# Patient Record
Sex: FEMALE | Race: WHITE | NOT HISPANIC OR LATINO | Employment: FULL TIME | URBAN - METROPOLITAN AREA
[De-identification: names, ages, dates, MRNs, and addresses within clinical notes are randomized per-mention and may not be internally consistent; named-entity substitution may affect disease eponyms.]

---

## 2023-04-07 ENCOUNTER — OFFICE VISIT (OUTPATIENT)
Dept: PRIMARY CARE | Facility: CLINIC | Age: 58
End: 2023-04-07
Payer: COMMERCIAL

## 2023-04-07 VITALS
HEART RATE: 65 BPM | WEIGHT: 251 LBS | SYSTOLIC BLOOD PRESSURE: 115 MMHG | OXYGEN SATURATION: 97 % | TEMPERATURE: 98.6 F | HEIGHT: 71 IN | BODY MASS INDEX: 35.14 KG/M2 | DIASTOLIC BLOOD PRESSURE: 75 MMHG

## 2023-04-07 DIAGNOSIS — T78.40XA ALLERGY, INITIAL ENCOUNTER: ICD-10-CM

## 2023-04-07 DIAGNOSIS — H10.13 ALLERGIC CONJUNCTIVITIS OF BOTH EYES: Primary | ICD-10-CM

## 2023-04-07 PROCEDURE — 99213 OFFICE O/P EST LOW 20 MIN: CPT | Performed by: INTERNAL MEDICINE

## 2023-04-07 RX ORDER — LEVOCETIRIZINE DIHYDROCHLORIDE 5 MG/1
5 TABLET, FILM COATED ORAL EVERY EVENING
Qty: 14 TABLET | Refills: 1 | Status: SHIPPED | OUTPATIENT
Start: 2023-04-07 | End: 2023-04-21

## 2023-04-07 RX ORDER — PROPRANOLOL HYDROCHLORIDE 10 MG/1
10 TABLET ORAL DAILY PRN
COMMUNITY

## 2023-04-07 RX ORDER — IPRATROPIUM BROMIDE 42 UG/1
2 SPRAY, METERED NASAL DAILY
COMMUNITY
Start: 2022-08-10

## 2023-04-07 RX ORDER — HYDROQUINONE 40 MG/G
CREAM TOPICAL 2 TIMES DAILY
COMMUNITY
Start: 2022-10-28

## 2023-04-07 RX ORDER — OLOPATADINE HYDROCHLORIDE 1 MG/ML
1 SOLUTION/ DROPS OPHTHALMIC 2 TIMES DAILY PRN
Qty: 5 ML | Refills: 2 | Status: SHIPPED | OUTPATIENT
Start: 2023-04-07 | End: 2023-04-22

## 2023-04-07 ASSESSMENT — PAIN SCALES - GENERAL: PAINLEVEL: 0-NO PAIN

## 2023-04-07 ASSESSMENT — LIFESTYLE VARIABLES
AUDIT-C TOTAL SCORE: 0
HOW MANY STANDARD DRINKS CONTAINING ALCOHOL DO YOU HAVE ON A TYPICAL DAY: PATIENT DOES NOT DRINK
HOW OFTEN DO YOU HAVE A DRINK CONTAINING ALCOHOL: NEVER
HOW OFTEN DO YOU HAVE SIX OR MORE DRINKS ON ONE OCCASION: NEVER
SKIP TO QUESTIONS 9-10: 1

## 2023-04-07 ASSESSMENT — COLUMBIA-SUICIDE SEVERITY RATING SCALE - C-SSRS: 1. IN THE PAST MONTH, HAVE YOU WISHED YOU WERE DEAD OR WISHED YOU COULD GO TO SLEEP AND NOT WAKE UP?: NO

## 2023-04-07 ASSESSMENT — PATIENT HEALTH QUESTIONNAIRE - PHQ9
1. LITTLE INTEREST OR PLEASURE IN DOING THINGS: NOT AT ALL
SUM OF ALL RESPONSES TO PHQ9 QUESTIONS 1 AND 2: 0
2. FEELING DOWN, DEPRESSED OR HOPELESS: NOT AT ALL

## 2023-04-07 NOTE — PROGRESS NOTES
"Subjective   Patient ID: Tomeka Perea is a 57 y.o. female who presents for Blepharitis.    HPI patient present s to clinic complaining of swelling of left eye for 2 months and redness of  both eye for 3 months.  Her symptoms are associated with pruritus.  She denies any headache, visual loss, nausea vomiting, facial pain and shortness of breath.  She has past history of  factor x def,colonic polyp,gerd,vitamin d def,melasma,lumbar radiculopathy    Review of Systems   Constitutional: Negative.    HENT: Negative.     Eyes:  Positive for redness and itching.   Respiratory: Negative.     Cardiovascular: Negative.    Gastrointestinal: Negative.    Endocrine: Negative.    Genitourinary: Negative.    Musculoskeletal: Negative.    Skin: Negative.    Allergic/Immunologic: Negative.    Neurological: Negative.    Hematological: Negative.    Psychiatric/Behavioral: Negative.         Objective   /75   Pulse 65   Temp 37 °C (98.6 °F)   Ht 1.803 m (5' 11\")   Wt 114 kg (251 lb)   SpO2 97%   BMI 35.01 kg/m²     Physical Exam  Constitutional:       Appearance: Normal appearance. She is normal weight.   HENT:      Right Ear: Tympanic membrane normal.      Left Ear: Tympanic membrane and ear canal normal.      Nose: Nose normal.   Eyes:      Comments: Mild erythema of the eyes without any blister formation   Neck:      Vascular: No carotid bruit.   Cardiovascular:      Rate and Rhythm: Normal rate.   Pulmonary:      Effort: No respiratory distress.      Breath sounds: No stridor. No wheezing.   Abdominal:      Palpations: Abdomen is soft.      Tenderness: There is no guarding or rebound.   Skin:     Coloration: Skin is not jaundiced.   Neurological:      Mental Status: She is alert.   Psychiatric:         Mood and Affect: Mood normal.          Assessment/Plan    patient is given trial with Xyzal and Patanol eyedrops regarding symptoms of allergic conjunctivitis and allergies.  She will continue other home medications " and will notify the clinic for any worsening of her symptoms.

## 2023-04-07 NOTE — PROGRESS NOTES
"Subjective   Patient ID: Tomeka Perea is a 57 y.o. female who presents for Blepharitis.    HPI     Review of Systems    Objective   /75   Pulse 65   Temp 37 °C (98.6 °F)   Ht 1.803 m (5' 11\")   Wt 114 kg (251 lb)   SpO2 97%   BMI 35.01 kg/m²     Physical Exam    Assessment/Plan          "

## 2023-04-11 ASSESSMENT — ENCOUNTER SYMPTOMS
PSYCHIATRIC NEGATIVE: 1
EYE ITCHING: 1
EYE REDNESS: 1
GASTROINTESTINAL NEGATIVE: 1
NEUROLOGICAL NEGATIVE: 1
RESPIRATORY NEGATIVE: 1
ENDOCRINE NEGATIVE: 1
ALLERGIC/IMMUNOLOGIC NEGATIVE: 1
CARDIOVASCULAR NEGATIVE: 1
CONSTITUTIONAL NEGATIVE: 1
MUSCULOSKELETAL NEGATIVE: 1
HEMATOLOGIC/LYMPHATIC NEGATIVE: 1

## 2023-09-12 PROBLEM — Z86.010 HX OF COLONIC POLYPS: Status: ACTIVE | Noted: 2023-09-12

## 2023-09-12 PROBLEM — R76.8 HELICOBACTER PYLORI AB+: Status: ACTIVE | Noted: 2023-09-12

## 2023-09-12 PROBLEM — Z04.9 CONDITION NOT FOUND: Status: ACTIVE | Noted: 2023-09-12

## 2023-09-12 PROBLEM — D12.6 ADENOMATOUS POLYP OF COLON: Status: ACTIVE | Noted: 2023-09-12

## 2023-09-12 PROBLEM — D69.9 BLEEDING DISORDER (CMS-HCC): Status: ACTIVE | Noted: 2023-09-12

## 2023-09-12 PROBLEM — L82.0 INFLAMED SEBORRHEIC KERATOSIS: Status: ACTIVE | Noted: 2022-07-01

## 2023-09-12 PROBLEM — L57.9 SKIN CHANGES DUE TO CHRONIC EXPOSURE TO NONIONIZING RADIATION, UNSPECIFIED: Status: ACTIVE | Noted: 2022-07-01

## 2023-09-12 PROBLEM — K62.5 RECTUM BLEEDING: Status: ACTIVE | Noted: 2023-09-12

## 2023-09-12 PROBLEM — L81.4 OTHER MELANIN HYPERPIGMENTATION: Status: ACTIVE | Noted: 2022-07-01

## 2023-09-12 PROBLEM — D18.01 HEMANGIOMA OF SKIN AND SUBCUTANEOUS TISSUE: Status: ACTIVE | Noted: 2022-07-01

## 2023-09-12 PROBLEM — R68.2 DRY MOUTH: Status: ACTIVE | Noted: 2023-09-12

## 2023-09-12 PROBLEM — R10.13 EPIGASTRIC PAIN: Status: ACTIVE | Noted: 2023-09-12

## 2023-09-12 PROBLEM — D22.4 MELANOCYTIC NEVI OF SCALP AND NECK: Status: ACTIVE | Noted: 2022-07-01

## 2023-09-12 PROBLEM — R53.83 FATIGUE: Status: ACTIVE | Noted: 2023-09-12

## 2023-09-12 PROBLEM — M54.17 LUMBOSACRAL RADICULOPATHY AT L5: Status: ACTIVE | Noted: 2023-09-12

## 2023-09-12 PROBLEM — Z86.0100 HX OF COLONIC POLYPS: Status: ACTIVE | Noted: 2023-09-12

## 2023-09-12 PROBLEM — D22.70 MELANOCYTIC NEVI OF UNSPECIFIED LOWER LIMB, INCLUDING HIP: Status: ACTIVE | Noted: 2022-07-01

## 2023-09-12 PROBLEM — L73.8 OTHER SPECIFIED FOLLICULAR DISORDERS: Status: ACTIVE | Noted: 2022-07-01

## 2023-09-12 PROBLEM — Z87.39 H/O DEGENERATIVE DISC DISEASE: Status: ACTIVE | Noted: 2023-09-12

## 2023-09-12 PROBLEM — D22.60 MELANOCYTIC NEVI OF UNSPECIFIED UPPER LIMB, INCLUDING SHOULDER: Status: ACTIVE | Noted: 2022-07-01

## 2023-09-12 PROBLEM — D68.2: Status: ACTIVE | Noted: 2023-09-12

## 2023-09-12 PROBLEM — R57.8 HEMORRHAGIC SHOCK (MULTI): Status: ACTIVE | Noted: 2023-09-12

## 2023-09-12 PROBLEM — M54.50 LOW BACK PAIN: Status: ACTIVE | Noted: 2023-09-12

## 2023-09-12 PROBLEM — D48.5 NEOPLASM OF UNCERTAIN BEHAVIOR OF SKIN: Status: ACTIVE | Noted: 2022-07-01

## 2023-09-12 PROBLEM — K62.5 RECTAL BLEEDING: Status: ACTIVE | Noted: 2023-09-12

## 2023-09-12 PROBLEM — L57.3 POIKILODERMA OF CIVATTE: Status: ACTIVE | Noted: 2022-07-01

## 2023-09-12 PROBLEM — E78.00 HYPERCHOLESTEROLEMIA: Status: ACTIVE | Noted: 2023-09-12

## 2023-09-12 PROBLEM — E66.9 OBESITY (BMI 30-39.9): Status: ACTIVE | Noted: 2023-09-12

## 2023-09-12 PROBLEM — D22.5 MELANOCYTIC NEVI OF TRUNK: Status: ACTIVE | Noted: 2022-07-01

## 2023-09-12 PROBLEM — D22.39 MELANOCYTIC NEVI OF OTHER PARTS OF FACE: Status: ACTIVE | Noted: 2022-07-01

## 2023-09-12 PROBLEM — R21 RASH AND OTHER NONSPECIFIC SKIN ERUPTION: Status: ACTIVE | Noted: 2022-07-01

## 2023-09-12 PROBLEM — R10.9 INTESTINAL CRAMPS: Status: ACTIVE | Noted: 2023-09-12

## 2023-09-12 PROBLEM — G43.109 MIGRAINE WITH AURA AND WITHOUT STATUS MIGRAINOSUS, NOT INTRACTABLE: Status: ACTIVE | Noted: 2023-09-12

## 2023-09-12 PROBLEM — K21.9 ESOPHAGEAL REFLUX: Status: ACTIVE | Noted: 2023-09-12

## 2023-09-12 PROBLEM — E04.9 GOITER: Status: ACTIVE | Noted: 2023-09-12

## 2023-09-12 PROBLEM — R14.0 ABDOMINAL BLOATING: Status: ACTIVE | Noted: 2023-09-12

## 2023-09-12 PROBLEM — L82.1 OTHER SEBORRHEIC KERATOSIS: Status: ACTIVE | Noted: 2022-07-01

## 2023-09-12 RX ORDER — CLINDAMYCIN PHOSPHATE 10 UG/ML
1 LOTION TOPICAL
COMMUNITY
Start: 2022-06-28

## 2023-09-12 RX ORDER — OMEPRAZOLE 40 MG/1
40 CAPSULE, DELAYED RELEASE ORAL DAILY
COMMUNITY

## 2023-09-12 RX ORDER — TRETINOIN 0.25 MG/G
1 CREAM TOPICAL
COMMUNITY
Start: 2022-06-28

## 2023-09-12 RX ORDER — PROPRANOLOL HYDROCHLORIDE 10 MG/1
5 TABLET ORAL 3 TIMES DAILY
COMMUNITY

## 2023-09-12 RX ORDER — CLOBETASOL PROPIONATE 0.5 MG/G
1 CREAM TOPICAL
COMMUNITY
Start: 2018-03-09

## 2023-10-23 ENCOUNTER — OFFICE VISIT (OUTPATIENT)
Dept: HEMATOLOGY/ONCOLOGY | Facility: HOSPITAL | Age: 58
End: 2023-10-23
Payer: COMMERCIAL

## 2023-10-23 VITALS
HEART RATE: 59 BPM | OXYGEN SATURATION: 100 % | BODY MASS INDEX: 34.51 KG/M2 | RESPIRATION RATE: 20 BRPM | HEIGHT: 71 IN | DIASTOLIC BLOOD PRESSURE: 72 MMHG | WEIGHT: 246.47 LBS | SYSTOLIC BLOOD PRESSURE: 123 MMHG | TEMPERATURE: 98.1 F

## 2023-10-23 DIAGNOSIS — D68.2: Primary | ICD-10-CM

## 2023-10-23 DIAGNOSIS — E61.1 IRON DEFICIENCY: ICD-10-CM

## 2023-10-23 PROCEDURE — 1036F TOBACCO NON-USER: CPT | Performed by: STUDENT IN AN ORGANIZED HEALTH CARE EDUCATION/TRAINING PROGRAM

## 2023-10-23 PROCEDURE — 99214 OFFICE O/P EST MOD 30 MIN: CPT | Performed by: STUDENT IN AN ORGANIZED HEALTH CARE EDUCATION/TRAINING PROGRAM

## 2023-10-23 ASSESSMENT — COLUMBIA-SUICIDE SEVERITY RATING SCALE - C-SSRS
6. HAVE YOU EVER DONE ANYTHING, STARTED TO DO ANYTHING, OR PREPARED TO DO ANYTHING TO END YOUR LIFE?: NO
2. HAVE YOU ACTUALLY HAD ANY THOUGHTS OF KILLING YOURSELF?: NO

## 2023-10-23 ASSESSMENT — PAIN SCALES - GENERAL: PAINLEVEL: 0-NO PAIN

## 2023-10-23 ASSESSMENT — ENCOUNTER SYMPTOMS
OCCASIONAL FEELINGS OF UNSTEADINESS: 0
DEPRESSION: 0
LOSS OF SENSATION IN FEET: 0

## 2023-10-23 NOTE — PROGRESS NOTES
Patient ID: Tomeka Perea is a 58 y.o. female.  Referring Physician: No referring provider defined for this encounter.  Primary Care Provider: Johnna Zuñiga MD  Visit Type: Initial Visit  Diagnosis: mild factor X deficiency       Subjective    HPI  58 y.o. female with a PMH of multiple nasal surgeries due to cleft lip, COVID (09/ 21), GERD and HLD who is coming in for evaluation due to concern for bleeding disorder.       As regards her bleeding history,   - She denies excessive bleeding from umbilical cord at birth, falls and injuries, dental procedures. No easy bruising. Not currently having menses but when she did she reports that they were not heavy. Had correction of cleft palate w/o significant bleeding during or after procedure.   - However after that she had multiple nasal surgeries (at least 5-6) over the past 25 years. During/post which significant bleeding was noted. However she has never had a full coag work up for bleeding, but reports that her plastic surgeon gave her what  she thinks is Vitamin K IV preceding and after her procedures to control bleeding. She reports bleeding 'instantly ceasing' once the IV starts, which is not consistent.   - Most recently, in 09/2021 patient underwent colonoscopy with polypectomy.  S/p polypectomy patient had immediate and severe bleeding from the site with  drop in Hgb and was given 2U of blood.  Needing admission and bleeding was eventually controlled only  after embolization of a branch of the NICKOLAS by IR. During that time her PT/ INR was elevated at 18.2/ 1.6 without being on any prior anticoagulation. Fibrinogen was 289. She thinks she was given IV vit K, but is unsure as she incidentally had covid at this  time and was unable to see her IV infusions. I cannot find any evidence of any blood products on file given at this time. Nor Vit K.      She denies any history of spontaneous nose bleeds, dark or bloody stool and dark or bloody urine.    Denies any  family history of bleeding disorder.        work up showed: inconsistently elevated PT/PTT, in fact was normal when we first saw her, but prolonged on follow up. CBC WNL.   TT 14.6, Fib 393-->410, DD 1210-->1477, JOSE M 1 38.4, plasminogen 38.4, XI 80  II 95-->96, V 95-->102, ,   VIII 168, vW Ag 144, vW GP1bM 110, vW CB 95, vW multimers normal pattern and distribution,   , X 42-->48  XIII adequate   Plt aggs: DECREASED AGGREGATION TO ADP (5 uM) AND EPINEPHRINE (100 uM). A LABORATORY STUDY OF PLATELET AGGREGATION AND STIMULATED GRANULE RELEASE WAS PERFORMED. THE PLATELET AGGREGATION  STUDY IS ABNORMAL. THERE IS DECREASED PLATELET AGGREGATION WITH ADP (5 uM) AND EPINEPHRINE (100 uM). THE AGGREGATION WITH THE OTHER AGONISTS AND DOSES WERE WITHIN THE    NORMAL RANGE. THE STIMULATED ATP RELEASE WAS WITHIN THE NORMAL RANGE FOR ALL OF THE  AGONISTS. THE RISTOCETIN INDUCED PLATELET AGGREGATION SHOWS AN INCREASED DOSE RESPONSE.  THE PATTERN IS NOT INDICATIVE OF A SPECIFIC DIAGNOSIS, BUT GLANZMANN THROMBASTHENIA, EDGARDO SOULIER DISEASE AND PLATELET DENSE GRANULE STORAGE POOL DISORDER ARE UNLIKELY. THIS INTERPRETATION IS RENDERED IN THE ABSENCE OF A MEDICATION AND CLINICAL HISTORY.      5/25/2022: pt reports no further bleeding events. We met to discuss her labs and plan for future testing and procedures. 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      8/29/2022:   Today she reports she is doing well with no complaints. She reports she will have a colonoscopy on 9/26/22 at [facility] and that the provider doing the procedure has requested guidance for the procedure. I given her mild factor deficiency,  I had planned to give her FFP for the procedure, however due to logistical issues this could not be accomplished and the gastroenterologist elected to proceed with a colo/EGD w/o biopsy,  diverticulosis and hemorrhoids were seen but no polyps, planned  to repeat in 3yrs.      10/17/2022: at follow up today pt reports the procedure was uneventful. She continues to be concerned about possibly acquiring amyloidosis as she  believes this could possibly cause her bleeding with factor X related issues despite genetic evidence.     10/23/23: notes issues with dry eye. No issues with bleeding. No planned procedures.     Review of Systems - Oncology  10 point review of systems negative except as stated in HPI    Objective     Past Surgical History:   Procedure Laterality Date    CT ABDOMEN PELVIS ANGIOGRAM W AND/OR WO IV CONTRAST  9/21/2021    CT ABDOMEN PELVIS ANGIOGRAM W AND/OR WO IV CONTRAST 9/21/2021 [facility] CLINICAL LEGACY    IR ANGIOGRAM AORTA ABDOMEN  9/21/2021    IR ANGIOGRAM AORTA ABDOMEN 9/21/2021 [facility] CLINICAL LEGACY    IR ANGIOGRAM AORTA ABDOMEN  9/21/2021    IR ANGIOGRAM AORTA ABDOMEN 9/21/2021 [facility] CLINICAL LEGACY    IR ANGIOGRAM INFERIOR EPIGASTRIC  9/21/2021    IR ANGIOGRAM INFERIOR EPIGASTRIC 9/21/2021 [facility] CLINICAL LEGACY    IR ANGIOGRAM INFERIOR EPIGASTRIC PELVIC  9/21/2021    IR ANGIOGRAM INFERIOR EPIGASTRIC PELVIC 9/21/2021 [facility] CLINICAL LEGACY    IR ANGIOGRAM INFERIOR EPIGASTRIC PELVIC  9/21/2021    IR ANGIOGRAM INFERIOR EPIGASTRIC PELVIC 9/21/2021 [facility] CLINICAL LEGACY    IR ANGIOGRAM INFERIOR EPIGASTRIC PELVIC  9/21/2021    IR ANGIOGRAM INFERIOR EPIGASTRIC PELVIC 9/21/2021 [facility] CLINICAL LEGACY    IR INTERVENTION VENOUS ARTERIAL PTA  9/21/2021    IR INTERVENTION VENOUS ARTERIAL PTA 9/21/2021 [facility] CLINICAL LEGACY    OTHER SURGICAL HISTORY  07/22/2014    Repair Of Cleft Lip    OTHER SURGICAL HISTORY  07/22/2014    Subcutaneous Injection Of Filling Material     Social history: Social Hx:  has 10 siblings, No EtOH, smoking or illicit drug use.   Family Hx:  Mother DMII and Breast cancer x 2 currently in remission.       reports that she has never smoked. She has never been exposed to tobacco  "smoke. She has never used smokeless tobacco.  She  reports that she does not currently use alcohol.  She  reports no history of drug use.  Family History   Problem Relation Name Age of Onset    Breast cancer Mother      Hypertension Father      Kidney disease Father       Oncology History    No history exists.       Physical Exam  Vitals reviewed.   Constitutional:       General: She is not in acute distress.     Appearance: She is normal weight.   HENT:      Head: Normocephalic and atraumatic.   Eyes:      Comments: No pallor or icterus   Neck:      Comments: No palpable cervical or axillary adenopathy   Cardiovascular:      Rate and Rhythm: Normal rate and regular rhythm.      Heart sounds: No murmur heard.  Abdominal:      Palpations: Abdomen is soft.      Tenderness: There is no abdominal tenderness.      Comments: No palpable hepatosplenomegaly    Skin:     General: Skin is warm and dry.      Comments: No bruising    Neurological:      General: No focal deficit present.      Mental Status: She is alert and oriented to person, place, and time.   Psychiatric:         Mood and Affect: Mood normal.       BSA: 2.37 meters squared  /72   Pulse 59   Temp 36.7 °C (98.1 °F) (Core)   Resp 20   Ht (S) 1.809 m (5' 11.22\")   Wt 112 kg (246 lb 7.6 oz)   SpO2 100%   BMI 34.16 kg/m²     Labs:  Lab Results   Component Value Date    WBC 5.7 12/15/2022    NEUTROABS 2.42 03/10/2022    LYMPHSABS 1.73 03/10/2022    MONOSABS 0.30 03/10/2022    EOSABS 0.12 03/10/2022    BASOSABS 0.06 03/10/2022    RBC 5.26 (H) 12/15/2022    MCV 88 12/15/2022    MCHC 33.2 12/15/2022    HGB 15.3 12/15/2022    HCT 46.1 (H) 12/15/2022     12/15/2022     No results found for: \"RETICCTPCT\"   Lab Results   Component Value Date    CREATININE 0.77 12/15/2022    BUN 13 12/15/2022     12/15/2022    K 4.3 12/15/2022     12/15/2022    CO2 27 12/15/2022      Lab Results   Component Value Date    ALT 10 12/15/2022    AST 14 12/15/2022 " "   ALKPHOS 64 12/15/2022    BILITOT 1.0 12/15/2022      Lab Results   Component Value Date    TSH 1.37 12/15/2022     Lab Results   Component Value Date    TSH 1.37 12/15/2022     Lab Results   Component Value Date    IRON 57 02/02/2022    TIBC 396 02/02/2022      No results found for: \"XTAPBEAD47\"   No results found for: \"FOLATE\"  No results found for: \"VANGIE\", \"RF\", \"SEDRATE\"   Lab Results   Component Value Date    CRP 1.32 (A) 02/03/2021      No results found for: \"JESENIA\"  No results found for: \"LDH\"  No results found for: \"HAPTOGLOBIN\"  Lab Results   Component Value Date    SPEP NORMAL 08/29/2022     No results found for: \"IGG\", \"IGM\", \"IGA\"     No components found for: \"PT\"  aPTT   Date/Time Value Ref Range Status   02/10/2022 11:50 AM 42 (H) 26 - 39 sec Final     Comment:     Note new reference range as of 11/30/2021 at 10:00am.   02/02/2022 11:45 AM 41 (H) 26 - 39 sec Final     Comment:     Note new reference range as of 11/30/2021 at 10:00am.   12/02/2021 11:38 AM 40 (H) 26 - 39 sec Final     Comment:     Note new reference range as of 11/30/2021 at 10:00am.       Assessment/Plan    58 y.o. female with a PMH significant for cleft lip, GERD, HLD, who is followed for mild factor X deficiency with surgical bleeding.      Her extensive work up has shown elevated PT/PTT, decreased factor X levels to indicate a mild deficiency and her genetic F10 evaluation has confirmed a diagnosis of at least carrier state of factor X deficiency. Her bleeding history however points to  clinically relevant bleeding resulting from factor X deficiency and resolving with administration of FFP. Work up for amyloid negative.     Plan:   - She has not received factor yet, which is a consideration if she has high risk surgery. This will need to be organized for non-emergencies as this factor is not stored is significant quantities in hospitals.  - For lower risk procedures she should be fine with FFP.  - no planned procedures for now  - 1yr " followup   - I have advised the pt on her bleeding risk and the need for caution/planning with surgical procedures. Also asked her to get a medical bracelet with her factor deficiency noted if she is unable to speak for herself.      Maycol Silverio MD

## 2024-07-10 ENCOUNTER — OFFICE VISIT (OUTPATIENT)
Dept: PRIMARY CARE | Facility: CLINIC | Age: 59
End: 2024-07-10
Payer: COMMERCIAL

## 2024-07-10 VITALS
BODY MASS INDEX: 27.22 KG/M2 | SYSTOLIC BLOOD PRESSURE: 146 MMHG | HEIGHT: 72 IN | WEIGHT: 201 LBS | DIASTOLIC BLOOD PRESSURE: 80 MMHG

## 2024-07-10 DIAGNOSIS — R53.83 OTHER FATIGUE: ICD-10-CM

## 2024-07-10 DIAGNOSIS — R73.03 PRE-DIABETES: ICD-10-CM

## 2024-07-10 DIAGNOSIS — J40 BRONCHITIS: ICD-10-CM

## 2024-07-10 DIAGNOSIS — E78.00 HYPERCHOLESTEROLEMIA: ICD-10-CM

## 2024-07-10 PROCEDURE — 99213 OFFICE O/P EST LOW 20 MIN: CPT | Performed by: INTERNAL MEDICINE

## 2024-07-10 RX ORDER — LEVOFLOXACIN 500 MG/1
500 TABLET, FILM COATED ORAL DAILY
Qty: 10 TABLET | Refills: 0 | Status: SHIPPED | OUTPATIENT
Start: 2024-07-10 | End: 2024-07-20

## 2024-07-10 RX ORDER — LORATADINE 10 MG/1
10 TABLET ORAL DAILY
Qty: 30 TABLET | Refills: 2 | Status: SHIPPED | OUTPATIENT
Start: 2024-07-10 | End: 2024-10-08

## 2024-07-10 RX ORDER — FLUTICASONE FUROATE 27.5 UG/1
1 SPRAY, METERED NASAL
Qty: 10 G | Refills: 5 | Status: SHIPPED | OUTPATIENT
Start: 2024-07-10 | End: 2025-07-10

## 2024-07-10 RX ORDER — GUAIFENESIN 100 MG/5ML
200 SOLUTION ORAL 3 TIMES DAILY PRN
Qty: 120 ML | Refills: 0 | Status: SHIPPED | OUTPATIENT
Start: 2024-07-10 | End: 2024-07-20

## 2024-07-10 NOTE — LETTER
July 10, 2024     Patient: Tomeka Perea   YOB: 1965   Date of Visit: 7/10/2024       To Whom It May Concern:    Tomeka Perea was seen in my clinic on 7/10/2024 at 12:00 pm. Please excuse Tomeka for her absence from work 07/11/2024 through 07/14/2024. Tomeka may return on 07/15/2024.    If you have any questions or concerns, please don't hesitate to call.         Sincerely,         Traci Serrano MD

## 2024-07-11 ASSESSMENT — ENCOUNTER SYMPTOMS: COUGH: 1

## 2024-07-11 NOTE — PROGRESS NOTES
"Subjective   Patient ID: Tomeka Perea is a 58 y.o. female who presents for Establish Care, Earache, and Cough.    The patient today came here for multiple medical issues.  Cough, yellow sputum, sinus congestion, bronchitis going on for last several days.  She took doxycycline, but not with good results.  She came for follow-up.    I have personally reviewed the patient's Past Medical History, Medications, Allergies, Social History, and Family History in the EMR.    CURRENT MEDICATIONS: Nothing regularly, but she is just finishing doxycycline.    Earache   Associated symptoms include coughing.   Cough  Associated symptoms include ear pain.     Review of Systems   HENT:  Positive for ear pain.    Respiratory:  Positive for cough.    All other systems reviewed and are negative.  She has a Factor X deficiency and she was born with a cleft lip palate.    Objective   /80   Ht 1.816 m (5' 11.5\")   Wt 91.2 kg (201 lb)   BMI 27.64 kg/m²     Physical Exam  Vitals reviewed.   HENT:      Right Ear: Tympanic membrane is retracted.      Left Ear: Tympanic membrane is retracted.      Nose: Congestion present.      Comments: Postnasal drip.     Mouth/Throat:      Comments: Throat congested.    Cardiovascular:      Heart sounds: Normal heart sounds, S1 normal and S2 normal. No murmur heard.     No friction rub.   Pulmonary:      Effort: Pulmonary effort is normal.      Breath sounds: Normal breath sounds and air entry.   Abdominal:      Palpations: There is no hepatomegaly, splenomegaly or mass.   Musculoskeletal:      Right lower leg: No edema.      Left lower leg: No edema.   Lymphadenopathy:      Lower Body: No right inguinal adenopathy. No left inguinal adenopathy.   Neurological:      Cranial Nerves: Cranial nerves 2-12 are intact.      Sensory: No sensory deficit.      Motor: Motor function is intact.      Deep Tendon Reflexes: Reflexes are normal and symmetric.     LAB WORK: Laboratory testing " discussed.    Assessment/Plan   Problem List Items Addressed This Visit             ICD-10-CM       Cardiac and Vasculature    Hypercholesterolemia E78.00    Relevant Orders    Comprehensive Metabolic Panel    Lipid Panel       Symptoms and Signs    Fatigue R53.83    Relevant Orders    CBC    Vitamin B12    Urinalysis with Reflex Microscopic    Thyroid Stimulating Hormone     Other Visit Diagnoses         Codes    Pre-diabetes     R73.03    Relevant Orders    Hemoglobin A1C    Bronchitis     J40    Relevant Medications    levoFLOXacin (Levaquin) 500 mg tablet    loratadine (Claritin) 10 mg tablet    fluticasone (Flonase Sensimist) 27.5 mcg/actuation nasal spray    guaiFENesin (Robitussin) 100 mg/5 mL syrup        1. Rhinosinusitis, pharyngitis, eustachian tube dysfunction, otitis media.  Levaquin, Claritin, Robitussin, Flonase.  2. Cholesterol.  Monitor.  3. Blood work ordered.  4. Clotting problem, but it is not acute.  Blood work ordered.  5. Follow-up in a week after tests.  6. Continue to follow.    Scribe Attestation  By signing my name below, ISavita Scribe attest that this documentation has been prepared under the direction and in the presence of Traci Serrano MD.

## 2024-09-24 ENCOUNTER — LAB (OUTPATIENT)
Dept: LAB | Facility: LAB | Age: 59
End: 2024-09-24
Payer: COMMERCIAL

## 2024-09-24 DIAGNOSIS — R53.83 OTHER FATIGUE: ICD-10-CM

## 2024-09-24 DIAGNOSIS — E78.00 HYPERCHOLESTEROLEMIA: ICD-10-CM

## 2024-09-24 DIAGNOSIS — R73.03 PRE-DIABETES: ICD-10-CM

## 2024-09-24 LAB
ALBUMIN SERPL BCP-MCNC: 4.2 G/DL (ref 3.4–5)
ALP SERPL-CCNC: 51 U/L (ref 33–110)
ALT SERPL W P-5'-P-CCNC: 12 U/L (ref 7–45)
ANION GAP SERPL CALC-SCNC: 11 MMOL/L (ref 10–20)
APPEARANCE UR: CLEAR
AST SERPL W P-5'-P-CCNC: 17 U/L (ref 9–39)
BILIRUB SERPL-MCNC: 0.7 MG/DL (ref 0–1.2)
BILIRUB UR STRIP.AUTO-MCNC: NEGATIVE MG/DL
BUN SERPL-MCNC: 19 MG/DL (ref 6–23)
CALCIUM SERPL-MCNC: 9.1 MG/DL (ref 8.6–10.6)
CHLORIDE SERPL-SCNC: 104 MMOL/L (ref 98–107)
CHOLEST SERPL-MCNC: 187 MG/DL (ref 0–199)
CHOLESTEROL/HDL RATIO: 2.7
CO2 SERPL-SCNC: 30 MMOL/L (ref 21–32)
COLOR UR: NORMAL
CREAT SERPL-MCNC: 0.78 MG/DL (ref 0.5–1.05)
EGFRCR SERPLBLD CKD-EPI 2021: 89 ML/MIN/1.73M*2
ERYTHROCYTE [DISTWIDTH] IN BLOOD BY AUTOMATED COUNT: 13.8 % (ref 11.5–14.5)
EST. AVERAGE GLUCOSE BLD GHB EST-MCNC: 97 MG/DL
GLUCOSE SERPL-MCNC: 86 MG/DL (ref 74–99)
GLUCOSE UR STRIP.AUTO-MCNC: NORMAL MG/DL
HBA1C MFR BLD: 5 %
HCT VFR BLD AUTO: 43.9 % (ref 36–46)
HDLC SERPL-MCNC: 69.8 MG/DL
HGB BLD-MCNC: 14.2 G/DL (ref 12–16)
KETONES UR STRIP.AUTO-MCNC: NEGATIVE MG/DL
LDLC SERPL CALC-MCNC: 109 MG/DL
LEUKOCYTE ESTERASE UR QL STRIP.AUTO: NEGATIVE
MCH RBC QN AUTO: 29.2 PG (ref 26–34)
MCHC RBC AUTO-ENTMCNC: 32.3 G/DL (ref 32–36)
MCV RBC AUTO: 90 FL (ref 80–100)
NITRITE UR QL STRIP.AUTO: NEGATIVE
NON HDL CHOLESTEROL: 117 MG/DL (ref 0–149)
NRBC BLD-RTO: 0 /100 WBCS (ref 0–0)
PH UR STRIP.AUTO: 5.5 [PH]
PLATELET # BLD AUTO: 232 X10*3/UL (ref 150–450)
POTASSIUM SERPL-SCNC: 4.8 MMOL/L (ref 3.5–5.3)
PROT SERPL-MCNC: 6.2 G/DL (ref 6.4–8.2)
PROT UR STRIP.AUTO-MCNC: NEGATIVE MG/DL
RBC # BLD AUTO: 4.86 X10*6/UL (ref 4–5.2)
RBC # UR STRIP.AUTO: NEGATIVE /UL
SODIUM SERPL-SCNC: 140 MMOL/L (ref 136–145)
SP GR UR STRIP.AUTO: 1.01
TRIGL SERPL-MCNC: 40 MG/DL (ref 0–149)
TSH SERPL-ACNC: 1.56 MIU/L (ref 0.44–3.98)
UROBILINOGEN UR STRIP.AUTO-MCNC: NORMAL MG/DL
VIT B12 SERPL-MCNC: 295 PG/ML (ref 211–911)
VLDL: 8 MG/DL (ref 0–40)
WBC # BLD AUTO: 4.3 X10*3/UL (ref 4.4–11.3)

## 2024-09-24 PROCEDURE — 83036 HEMOGLOBIN GLYCOSYLATED A1C: CPT

## 2024-09-24 PROCEDURE — 84443 ASSAY THYROID STIM HORMONE: CPT

## 2024-09-24 PROCEDURE — 81003 URINALYSIS AUTO W/O SCOPE: CPT

## 2024-09-24 PROCEDURE — 85027 COMPLETE CBC AUTOMATED: CPT

## 2024-09-24 PROCEDURE — 36415 COLL VENOUS BLD VENIPUNCTURE: CPT

## 2024-09-24 PROCEDURE — 80053 COMPREHEN METABOLIC PANEL: CPT

## 2024-09-24 PROCEDURE — 80061 LIPID PANEL: CPT

## 2024-09-24 PROCEDURE — 82607 VITAMIN B-12: CPT

## 2024-10-04 ENCOUNTER — OFFICE VISIT (OUTPATIENT)
Dept: URGENT CARE | Age: 59
End: 2024-10-04
Payer: COMMERCIAL

## 2024-10-04 VITALS
WEIGHT: 195 LBS | DIASTOLIC BLOOD PRESSURE: 64 MMHG | TEMPERATURE: 97.2 F | HEIGHT: 72 IN | RESPIRATION RATE: 16 BRPM | BODY MASS INDEX: 26.41 KG/M2 | HEART RATE: 54 BPM | OXYGEN SATURATION: 98 % | SYSTOLIC BLOOD PRESSURE: 121 MMHG
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
## 2024-10-04 RX ORDER — METRONIDAZOLE 500 MG/1
500 TABLET ORAL 3 TIMES DAILY
Qty: 9 TABLET | Refills: 0 | Status: SHIPPED | OUTPATIENT
Start: 2024-10-04 | End: 2024-10-07

## 2024-10-04 RX ORDER — DOXYCYCLINE 100 MG/1
100 CAPSULE ORAL 2 TIMES DAILY
Qty: 6 CAPSULE | Refills: 0 | Status: SHIPPED | OUTPATIENT
Start: 2024-10-04 | End: 2024-10-07

## 2024-10-04 ASSESSMENT — ENCOUNTER SYMPTOMS: WOUND: 1

## 2024-10-04 ASSESSMENT — PAIN SCALES - GENERAL: PAINLEVEL: 2

## 2024-10-04 NOTE — PROGRESS NOTES
Subjective   Patient ID: Tomeka Perea is a 59 y.o. female. They present today with a chief complaint of Animal Bite (Dog bite L leg  x today at work ).    History of Present Illness  Ms. Perea is a 59 year old female who presents to the clinic with left calf dog bite. Pt states it occurred today at work. This is a FROI. Pt does not know if the dog is up to date with shots. Pt does not know last tetanus. Pt denies any chest pain, sob, nv at this time.       Animal Bite  Please see scanned FROI    Past Medical History  Allergies as of 10/04/2024 - Reviewed 10/04/2024   Allergen Reaction Noted    Erythromycin Hives 04/07/2023    Keflex [cephalexin] Hives 04/07/2023    Penicillin Hives 04/07/2023       (Not in a hospital admission)       Past Medical History:   Diagnosis Date    Abdominal distension (gaseous) 02/03/2021    Abdominal bloating    Acute sinusitis, unspecified 09/21/2013    Acute sinusitis    Body mass index (BMI) 34.0-34.9, adult 10/05/2021    BMI 34.0-34.9,adult    Body mass index (BMI) 34.0-34.9, adult     BMI 34.0-34.9,adult    Body mass index (BMI) 35.0-35.9, adult 02/15/2021    BMI 35.0-35.9,adult    Cough, unspecified 09/21/2013    Cough    Encounter for follow-up examination after completed treatment for conditions other than malignant neoplasm 10/05/2021    Hospital discharge follow-up    Encounter for screening for malignant neoplasm of colon 03/03/2021    Colon cancer screening    Nontoxic goiter, 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    Obesity, unspecified 10/05/2021    Obesity (BMI 30-39.9)    Other intervertebral disc degeneration, lumbar region 07/03/2014    Disc degeneration, lumbar    Other specified abnormal immunological findings in serum 02/15/2021    Helicobacter pylori ab+    Palpitations 07/03/2014    Palpitations    Personal history of other diseases of the digestive system 10/05/2021    History of rectal bleeding    Personal history of other diseases of the  musculoskeletal system and connective tissue 10/22/2013    History of radiculopathy    Personal history of other diseases of the respiratory system 04/12/2017    History of chronic sinusitis    Personal history of other specified conditions 01/29/2020    History of palpitations    Personal history of other specified conditions 09/21/2013    History of postnasal drip    Personal history of other specified conditions 08/16/2022    History of epigastric pain    Rash and other nonspecific skin eruption 12/27/2017    Rash    Unspecified abdominal pain 02/15/2021    Intestinal cramps       Past Surgical History:   Procedure Laterality Date    CT ABDOMEN PELVIS ANGIOGRAM W AND/OR WO IV CONTRAST  9/21/2021    CT ABDOMEN PELVIS ANGIOGRAM W AND/OR WO IV CONTRAST 9/21/2021 [facility] CLINICAL LEGACY    IR ANGIOGRAM AORTA ABDOMEN  9/21/2021    IR ANGIOGRAM AORTA ABDOMEN 9/21/2021 [facility] CLINICAL LEGACY    IR ANGIOGRAM AORTA ABDOMEN  9/21/2021    IR ANGIOGRAM AORTA ABDOMEN 9/21/2021 [facility] CLINICAL LEGACY    IR ANGIOGRAM INFERIOR EPIGASTRIC  9/21/2021    IR ANGIOGRAM INFERIOR EPIGASTRIC 9/21/2021 [facility] CLINICAL LEGACY    IR ANGIOGRAM INFERIOR EPIGASTRIC PELVIC  9/21/2021    IR ANGIOGRAM INFERIOR EPIGASTRIC PELVIC 9/21/2021 [facility] CLINICAL LEGACY    IR ANGIOGRAM INFERIOR EPIGASTRIC PELVIC  9/21/2021    IR ANGIOGRAM INFERIOR EPIGASTRIC PELVIC 9/21/2021 [facility] CLINICAL LEGACY    IR ANGIOGRAM INFERIOR EPIGASTRIC PELVIC  9/21/2021    IR ANGIOGRAM INFERIOR EPIGASTRIC PELVIC 9/21/2021 [facility] CLINICAL LEGACY    IR INTERVENTION VENOUS ARTERIAL PTA  9/21/2021    IR INTERVENTION VENOUS ARTERIAL PTA 9/21/2021 [facility] CLINICAL LEGACY    OTHER SURGICAL HISTORY  07/22/2014    Repair Of Cleft Lip    OTHER SURGICAL HISTORY  07/22/2014    Subcutaneous Injection Of Filling Material        reports that she has never smoked. She has never been exposed to tobacco smoke. She has never used smokeless tobacco. She reports that she does not currently use alcohol. She  "reports that she does not use drugs.    Review of Systems  Review of Systems   Skin:  Positive for wound.   All other systems reviewed and are negative.                                 Objective    Vitals:    10/04/24 1622   BP: 121/64   Pulse: 54   Resp: 16   Temp: 36.2 °C (97.2 °F)   SpO2: 98%   Weight: 88.5 kg (195 lb)   Height: 1.816 m (5' 11.5\")     No LMP recorded.    Physical Exam  Constitutional:       Appearance: Normal appearance.   Eyes:      Extraocular Movements: Extraocular movements intact.      Conjunctiva/sclera: Conjunctivae normal.      Pupils: Pupils are equal, round, and reactive to light.   Skin:     Findings: Wound present.             Comments: Dog bite   Neurological:      General: No focal deficit present.      Mental Status: She is alert and oriented to person, place, and time. Mental status is at baseline.         Procedures    Point of Care Test & Imaging Results from this visit  No results found for this visit on 10/04/24.   No results found.    Diagnostic study results (if any) were reviewed by [facility].    Assessment/Plan   Allergies, medications, history, and pertinent labs/EKGs/Imaging reviewed by Wilfrido Bob, APRN-CNP.     Medical Decision Making  Signs and symptoms consistent with animal bite.  Tetanus shot given in clinic. So, signs or symptoms of cellulitis or sepsis at this time.  Patient is given prophylactic antibiotics protect against pasturella multocida. Otherwise general wound care measure provided and wound care at home discussed with patient. Return to clinic or present to ED if infection occurs or symptoms otherwise change or worsen. Otherwise follow up with PCP.  Animal encounter form completed        Orders and Diagnoses  Diagnoses and all orders for this visit:  Dog bite, initial encounter  -     Tdap vaccine, age 7 years and older  -     metroNIDAZOLE (Flagyl) 500 mg tablet; Take 1 tablet (500 mg) by mouth 3 times a day for 3 days.  -     " doxycycline (Vibramycin) 100 mg capsule; Take 1 capsule (100 mg) by mouth 2 times a day for 3 days. Take with at least 8 ounces (large glass) of water, do not lie down for 30 minutes after      Medical Admin Record      Patient disposition: Home    Please follow up with your primary provider within one week if symptoms do not improve.  You may schedule an appointment online at [email or web address] or call [phone]. Go to the Emergency Department if symptoms significantly worsen or if you develop chest pain or shortness of breath.    Electronically signed by [facility]  5:12 PM

## 2024-10-04 NOTE — PATIENT INSTRUCTIONS
Flagyl and doxy for 3 days  Flagyl- do not consume alcohol  Doxy- please do not have exposed skin to sun light.  Wash with mild soap and water, keep open to air. Please cover when out in public    Please follow up with your primary provider within one week if symptoms do not improve.  You may schedule an appointment online at \[email or web address] or call [phone]. Go to the Emergency Department if symptoms significantly worsen or if you develop chest pain or shortness of breath.

## 2025-01-24 ENCOUNTER — OFFICE VISIT (OUTPATIENT)
Dept: PRIMARY CARE | Facility: CLINIC | Age: 60
End: 2025-01-24
Payer: COMMERCIAL

## 2025-01-24 VITALS
HEIGHT: 72 IN | WEIGHT: 190 LBS | BODY MASS INDEX: 25.73 KG/M2 | SYSTOLIC BLOOD PRESSURE: 126 MMHG | DIASTOLIC BLOOD PRESSURE: 82 MMHG

## 2025-01-24 DIAGNOSIS — M54.2 NECK PAIN: ICD-10-CM

## 2025-01-24 DIAGNOSIS — D69.9 BLEEDING DISORDER (CMS-HCC): ICD-10-CM

## 2025-01-24 DIAGNOSIS — R53.83 OTHER FATIGUE: ICD-10-CM

## 2025-01-24 DIAGNOSIS — K62.5 RECTUM BLEEDING: ICD-10-CM

## 2025-01-24 DIAGNOSIS — K64.4 EXTERNAL HEMORRHOID: Primary | ICD-10-CM

## 2025-01-24 DIAGNOSIS — E78.00 HYPERCHOLESTEROLEMIA: ICD-10-CM

## 2025-01-24 DIAGNOSIS — B35.3 TINEA PEDIS, UNSPECIFIED LATERALITY: ICD-10-CM

## 2025-01-24 PROCEDURE — 99214 OFFICE O/P EST MOD 30 MIN: CPT | Performed by: INTERNAL MEDICINE

## 2025-01-24 PROCEDURE — 3008F BODY MASS INDEX DOCD: CPT | Performed by: INTERNAL MEDICINE

## 2025-01-24 PROCEDURE — 1036F TOBACCO NON-USER: CPT | Performed by: INTERNAL MEDICINE

## 2025-01-24 RX ORDER — HYDROCORTISONE 25 MG/G
CREAM TOPICAL 2 TIMES DAILY
Qty: 28 G | Refills: 0 | Status: SHIPPED | OUTPATIENT
Start: 2025-01-24

## 2025-01-24 RX ORDER — CYCLOBENZAPRINE HCL 10 MG
10 TABLET ORAL NIGHTLY PRN
Qty: 30 TABLET | Refills: 2 | Status: SHIPPED | OUTPATIENT
Start: 2025-01-24 | End: 2025-09-21

## 2025-01-24 ASSESSMENT — ENCOUNTER SYMPTOMS
LOSS OF SENSATION IN FEET: 0
OCCASIONAL FEELINGS OF UNSTEADINESS: 0
DEPRESSION: 0

## 2025-01-24 NOTE — LETTER
January 24, 2025     Patient: Tomeka Perea   YOB: 1965   Date of Visit: 1/24/2025       To Whom It May Concern:    Tomeka Perea was seen in my clinic on 1/24/2025 at 3:30 pm. Please excuse Tomeka for her absence from work on 01/18/2025 through 01/20/2025. Tomeka may return to work on 01/21/2025 with no restrictions.     If you have any questions or concerns, please don't hesitate to call.         Sincerely,         Traci Serrano MD

## 2025-01-25 NOTE — PROGRESS NOTES
"Subjective   Patient ID: Tomeka Perea is a 59 y.o. female who presents for Follow-up (Various conditions).    Ms. Tomeka Perea fell down on 01/18/2025.  She went to the [facility].  She has severe neck pain, lower back pain, right shoulder pain, and hemorrhoid bleed.  She is concerned with that.  Weak, tired, fatigued, and exhausted.  She has a history of Factor X deficiency.    I have personally reviewed the patient's Past Medical History, Medications, Allergies, Social History, and Family History in the EMR.    Review of Systems   All other systems reviewed and are negative.    Objective   /82   Ht 1.816 m (5' 11.5\")   Wt 86.2 kg (190 lb)   BMI 26.13 kg/m²     Physical Exam  Vitals reviewed.   Constitutional:       Comments: External hemorrhoid.   Neck:      Comments: Movements painful.  Cardiovascular:      Heart sounds: Normal heart sounds, S1 normal and S2 normal. No murmur heard.     No friction rub.   Pulmonary:      Effort: Pulmonary effort is normal.      Breath sounds: Normal breath sounds and air entry.   Abdominal:      Palpations: There is no hepatomegaly, splenomegaly or mass.   Musculoskeletal:      Right lower leg: No edema.      Left lower leg: No edema.      Comments: Shoulder pain.   Lymphadenopathy:      Lower Body: No right inguinal adenopathy. No left inguinal adenopathy.   Neurological:      Cranial Nerves: Cranial nerves 2-12 are intact.      Sensory: No sensory deficit.      Motor: Motor function is intact.      Deep Tendon Reflexes: Reflexes are normal and symmetric.     LAB WORK:  Laboratory testing discussed.    Assessment/Plan   Problem List Items Addressed This Visit             ICD-10-CM       Cardiac and Vasculature    Hypercholesterolemia E78.00    Relevant Orders    Comprehensive Metabolic Panel       Coag and Thromboembolic    Bleeding disorder (CMS-HCC) D69.9       Gastrointestinal and Abdominal    Rectum bleeding K62.5    Relevant Medications    " hydrocortisone (Anusol-HC) 2.5 % rectal cream    cyclobenzaprine (Flexeril) 10 mg tablet       Symptoms and Signs    Fatigue R53.83    Relevant Orders    CBC    Urinalysis with Reflex Microscopic     Other Visit Diagnoses         Codes    External hemorrhoid    -  Primary K64.4    Tinea pedis, unspecified laterality     B35.3    Neck pain     M54.2        1. External hemorrhoid.  Soak in hot water.  Anusol-HC.  2. Athlete foot.  I gave her Lotrimin cream.  3. Neck pain, shoulder pain.  Physical therapy.  Flexeril given.  4. She has a bleeding disorder.  Monitor.  Ordered workup.  5. Follow-up appointment with me, start therapy in four to six weeks.  6. Continue to follow.    Scribe Attestation  By signing my name below, I, Jackelyn Escudero attest that this documentation has been prepared under the direction and in the presence of Traci Serrano MD.   All medical record entries made by the julio [email or web address] were personally dictated by me I have reviewed the chart and agree the record accurately reflects my personal performance of his history physical examination and management

## 2025-01-27 DIAGNOSIS — Z12.11 ENCOUNTER FOR SCREENING COLONOSCOPY: ICD-10-CM

## 2025-01-27 DIAGNOSIS — Z12.31 SCREENING MAMMOGRAM, ENCOUNTER FOR: ICD-10-CM

## 2025-01-29 ENCOUNTER — HOSPITAL ENCOUNTER (OUTPATIENT)
Dept: RADIOLOGY | Facility: CLINIC | Age: 60
Discharge: HOME | End: 2025-01-29
Payer: COMMERCIAL

## 2025-01-29 VITALS — WEIGHT: 190 LBS | BODY MASS INDEX: 26.6 KG/M2 | HEIGHT: 71 IN

## 2025-01-29 DIAGNOSIS — Z12.31 SCREENING MAMMOGRAM, ENCOUNTER FOR: ICD-10-CM

## 2025-01-29 PROCEDURE — 77067 SCR MAMMO BI INCL CAD: CPT

## 2025-02-13 ENCOUNTER — APPOINTMENT (OUTPATIENT)
Dept: GASTROENTEROLOGY | Facility: CLINIC | Age: 60
End: 2025-02-13
Payer: COMMERCIAL

## 2025-02-13 VITALS — HEART RATE: 58 BPM | WEIGHT: 190 LBS | HEIGHT: 71 IN | OXYGEN SATURATION: 99 % | BODY MASS INDEX: 26.6 KG/M2

## 2025-02-13 DIAGNOSIS — Z86.0101 HISTORY OF ADENOMATOUS AND SERRATED COLON POLYPS: ICD-10-CM

## 2025-02-13 DIAGNOSIS — K64.8 INTERNAL HEMORRHOID: ICD-10-CM

## 2025-02-13 DIAGNOSIS — D68.2: Primary | ICD-10-CM

## 2025-02-13 PROCEDURE — 3008F BODY MASS INDEX DOCD: CPT | Performed by: INTERNAL MEDICINE

## 2025-02-13 PROCEDURE — 99213 OFFICE O/P EST LOW 20 MIN: CPT | Performed by: INTERNAL MEDICINE

## 2025-02-13 RX ORDER — SOD SULF/POT CHLORIDE/MAG SULF 1.479 G
TABLET ORAL
Qty: 24 TABLET | Refills: 0 | Status: SHIPPED | OUTPATIENT
Start: 2025-02-13

## 2025-02-13 NOTE — PROGRESS NOTES
REASON FOR VISIT: Discuss surveillance colonoscopy    HPI:  Tomeka Perea is a 59 y.o. female with a past medical history of large adenoma with high-grade dysplasia which was resected in 2021.  At that time had significant post polypectomy bleeding immediately and multiple clips placed without control of bleeding.  She was transferred urgently to [facility] and embolization was performed.  After that she was found to have factor X deficiency.  This polyp had been pedunculated and over 4 cm in size.  Had repeat colonoscopy without polyps 1 year later.  The decision was that small polyps could be removed safely after consulting with hematology but larger polyps would require likely FFP or administration of factor X..  She reports she recently had hemorrhoids flare which subsided.  Did not have any rectal bleeding.          PRIOR ENDOSCOPY    PAST MEDICAL HISTORY  Past Medical History:   Diagnosis Date    Abdominal distension (gaseous) 02/03/2021    Abdominal bloating    Acute sinusitis, unspecified 09/21/2013    Acute sinusitis    Body mass index (BMI) 34.0-34.9, adult 10/05/2021    BMI 34.0-34.9,adult    Body mass index (BMI) 34.0-34.9, adult     BMI 34.0-34.9,adult    Body mass index (BMI) 35.0-35.9, adult 02/15/2021    BMI 35.0-35.9,adult    Cough, unspecified 09/21/2013    Cough    Encounter for follow-up examination after completed treatment for conditions other than malignant neoplasm 10/05/2021    Hospital discharge follow-up    Encounter for screening for malignant neoplasm of colon 03/03/2021    Colon cancer screening    Factor X deficiency (Multi)     Hemorrhoids     Nontoxic goiter, 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    Obesity, unspecified 10/05/2021    Obesity (BMI 30-39.9)    Other intervertebral disc degeneration, lumbar region 07/03/2014    Disc degeneration, lumbar    Other specified abnormal immunological findings in serum 02/15/2021    Helicobacter pylori ab+    Palpitations  07/03/2014    Palpitations    Personal history of other diseases of the digestive system 10/05/2021    History of rectal bleeding    Personal history of other diseases of the musculoskeletal system and connective tissue 10/22/2013    History of radiculopathy    Personal history of other diseases of the respiratory system 04/12/2017    History of chronic sinusitis    Personal history of other specified conditions 01/29/2020    History of palpitations    Personal history of other specified conditions 09/21/2013    History of postnasal drip    Personal history of other specified conditions 08/16/2022    History of epigastric pain    Rash and other nonspecific skin eruption 12/27/2017    Rash    Unspecified abdominal pain 02/15/2021    Intestinal cramps       PAST SURGICAL HISTORY  Past Surgical History:   Procedure Laterality Date    CT ABDOMEN PELVIS ANGIOGRAM W AND/OR WO IV CONTRAST  9/21/2021    CT ABDOMEN PELVIS ANGIOGRAM W AND/OR WO IV CONTRAST 9/21/2021 [facility] CLINICAL LEGACY    IR ANGIOGRAM AORTA ABDOMEN  9/21/2021    IR ANGIOGRAM AORTA ABDOMEN 9/21/2021 [facility] CLINICAL LEGACY    IR ANGIOGRAM AORTA ABDOMEN  9/21/2021    IR ANGIOGRAM AORTA ABDOMEN 9/21/2021 [facility] CLINICAL LEGAC[facility]    IR ANGIOGRAM INFERIOR EPIGASTRIC  9/21/2021    IR ANGIOGRAM INFERIOR EPIGASTRIC 9/21/2021 [facility] CLINICAL LEGACY    IR ANGIOGRAM INFERIOR EPIGASTRIC PELVIC  9/21/2021    IR ANGIOGRAM INFERIOR EPIGASTRIC PELVIC 9/21/2021 [facility] CLINICAL LEGACY    IR ANGIOGRAM INFERIOR EPIGASTRIC PELVIC  9/21/2021    IR ANGIOGRAM INFERIOR EPIGASTRIC PELVIC 9/21/2021 [facility] CLINICAL LEGACY    IR ANGIOGRAM INFERIOR EPIGASTRIC PELVIC  9/21/2021    IR ANGIOGRAM INFERIOR EPIGASTRIC PELVIC 9/21/2021 [facility] CLINICAL LEGACY    IR INTERVENTION VENOUS ARTERIAL PTA  9/21/2021    IR INTERVENTION VENOUS ARTERIAL PTA 9/21/2021 [facility] CLINICAL LEGACY    OTHER SURGICAL HISTORY  07/22/2014    Repair Of Cleft Lip    OTHER SURGICAL HISTORY  07/22/2014    Subcutaneous Injection Of Filling  Material       FAMILY HISTORY  Family History   Problem Relation Name Age of Onset    Breast cancer Mother      Hypertension Father      Kidney disease Father         SOCIAL HISTORY  Social History     Tobacco Use    Smoking status: Never     Passive exposure: Never    Smokeless tobacco: Never   Substance Use Topics    Alcohol use: Never       REVIEW OF SYSTEMS  CONSTITUTIONAL: negative for fever, chills, fatigue, or unintentional weight loss,   HEENT: negative for icteric sclera, eye pain/redness, or changes in vision/hearing  RESPIRATORY: negative for cough, hemoptysis, wheezing, orthopnea, or dyspnea on exertion  CARDIOVASCULAR: negative for chest pain, palpitations, or syncope   GASTROINTESTINAL: as noted per HPI  GENITOURINARY: negative for dysuria, polyuria, incontinence, or hematuria  MUSCULOSKELETAL: negative for arthralgia, myalgia, or joint swelling/stiffness   INTEGUMENTARY/SKIN: negative jaundice, rash, or skin lesion  HEMATOLOGIC/LYMPHATIC: negative for prolonged bleeding, easy bruising, or swollen lymph nodes  ENDOCRINE: negative for cold/heat intolerance, polydipsia, polyuria, or goiter  NEUROLOGIC: negative for headaches, dizziness, tremor, or gait abnormality  PSYCHIATRIC: negative for anxiety, depression, personality changes, or sleep disturbances      A 10 point review of systems was completed and was otherwise negative.    ALLERGIES  Allergies   Allergen Reactions    Erythromycin Hives    Keflex [Cephalexin] Hives    Penicillin Hives       MEDICATIONS  Current Outpatient Medications   Medication Sig Dispense Refill    clobetasol (Temovate) 0.05 % cream 1 Application.      cyclobenzaprine (Flexeril) 10 mg tablet Take 1 tablet (10 mg) by mouth as needed at bedtime for muscle spasms. 30 tablet 2    fluticasone (Flonase Sensimist) 27.5 mcg/actuation nasal spray Administer 1 spray into each nostril once daily. 10 g 5    hydrocortisone (Anusol-HC) 2.5 % rectal cream Insert into the rectum 2 times a  "day. 28 g 0    levocetirizine (Xyzal) 5 mg tablet Take 1 tablet (5 mg) by mouth once daily in the evening for 14 days. 14 tablet 1    loratadine (Claritin) 10 mg tablet Take 1 tablet (10 mg) by mouth once daily. 30 tablet 2    olopatadine (Patanol) 0.1 % ophthalmic solution Administer 1 drop into both eyes 2 times a day as needed for allergies for up to 15 days. 5 mL 2    sod sulf-pot chloride-mag sulf (Sutab) 1.479-0.188- 0.225 gram tablet tablet Starting at 6pm open one bottle of pills and fill glass provided with water and drink according to prep sheet. Start the 2nd bottle with directions on prep sheet 5 hours before procedure time 24 tablet 0     No current facility-administered medications for this visit.       VITALS  Pulse 58   Ht 1.803 m (5' 11\")   Wt 86.2 kg (190 lb)   SpO2 99%   BMI 26.50 kg/m²      PHYSICAL EXAM  Alert and oriented in no acute distress      ASSESSMENT/ PLAN  Patient with history of large high-grade dysplasia polyp resected in 2021 which was pedunculated.  Follow-up colonoscopy in 2022 without polyp.  I recommend surveillance colonoscopy this year to be done at [facility].  Will be done by one of my colleagues.  The recommendation is that small polyps could be resected but if patient has a larger polyp she may require repeat scoping with arrangement of factor X given prior to procedure or FFP.  She is in agreement with the plan and will follow-up at colonoscopy.        Signature: Neil North MD    Date: 2/13/2025  Time: 2:39 PM    "

## 2025-04-23 ENCOUNTER — ANESTHESIA (OUTPATIENT)
Dept: GASTROENTEROLOGY | Facility: HOSPITAL | Age: 60
End: 2025-04-23
Payer: COMMERCIAL

## 2025-04-23 ENCOUNTER — ANESTHESIA EVENT (OUTPATIENT)
Dept: GASTROENTEROLOGY | Facility: HOSPITAL | Age: 60
End: 2025-04-23
Payer: COMMERCIAL

## 2025-04-23 ENCOUNTER — HOSPITAL ENCOUNTER (OUTPATIENT)
Dept: GASTROENTEROLOGY | Facility: HOSPITAL | Age: 60
Discharge: HOME | End: 2025-04-23
Payer: COMMERCIAL

## 2025-04-23 VITALS
RESPIRATION RATE: 13 BRPM | WEIGHT: 176.37 LBS | BODY MASS INDEX: 24.69 KG/M2 | SYSTOLIC BLOOD PRESSURE: 118 MMHG | HEART RATE: 56 BPM | OXYGEN SATURATION: 99 % | DIASTOLIC BLOOD PRESSURE: 75 MMHG | HEIGHT: 71 IN | TEMPERATURE: 97.7 F

## 2025-04-23 DIAGNOSIS — Z86.0101 HISTORY OF ADENOMATOUS AND SERRATED COLON POLYPS: ICD-10-CM

## 2025-04-23 PROCEDURE — A45378 PR COLONOSCOPY,DIAGNOSTIC: Performed by: STUDENT IN AN ORGANIZED HEALTH CARE EDUCATION/TRAINING PROGRAM

## 2025-04-23 PROCEDURE — 3700000001 HC GENERAL ANESTHESIA TIME - INITIAL BASE CHARGE

## 2025-04-23 PROCEDURE — 2500000004 HC RX 250 GENERAL PHARMACY W/ HCPCS (ALT 636 FOR OP/ED): Mod: JZ

## 2025-04-23 PROCEDURE — 45378 DIAGNOSTIC COLONOSCOPY: CPT | Performed by: INTERNAL MEDICINE

## 2025-04-23 PROCEDURE — 3700000002 HC GENERAL ANESTHESIA TIME - EACH INCREMENTAL 1 MINUTE

## 2025-04-23 PROCEDURE — A45378 PR COLONOSCOPY,DIAGNOSTIC

## 2025-04-23 PROCEDURE — 7100000010 HC PHASE TWO TIME - EACH INCREMENTAL 1 MINUTE

## 2025-04-23 PROCEDURE — 7100000009 HC PHASE TWO TIME - INITIAL BASE CHARGE

## 2025-04-23 RX ORDER — MIDAZOLAM HYDROCHLORIDE 1 MG/ML
INJECTION INTRAMUSCULAR; INTRAVENOUS AS NEEDED
Status: DISCONTINUED | OUTPATIENT
Start: 2025-04-23 | End: 2025-04-23

## 2025-04-23 RX ORDER — LIDOCAINE HYDROCHLORIDE 20 MG/ML
INJECTION, SOLUTION EPIDURAL; INFILTRATION; INTRACAUDAL; PERINEURAL AS NEEDED
Status: DISCONTINUED | OUTPATIENT
Start: 2025-04-23 | End: 2025-04-23

## 2025-04-23 RX ORDER — PROPOFOL 10 MG/ML
INJECTION, EMULSION INTRAVENOUS AS NEEDED
Status: DISCONTINUED | OUTPATIENT
Start: 2025-04-23 | End: 2025-04-23

## 2025-04-23 RX ORDER — ONDANSETRON HYDROCHLORIDE 2 MG/ML
INJECTION, SOLUTION INTRAVENOUS AS NEEDED
Status: DISCONTINUED | OUTPATIENT
Start: 2025-04-23 | End: 2025-04-23

## 2025-04-23 RX ADMIN — PROPOFOL 40 MG: 10 INJECTION, EMULSION INTRAVENOUS at 08:03

## 2025-04-23 RX ADMIN — SODIUM CHLORIDE: 9 INJECTION, SOLUTION INTRAVENOUS at 07:59

## 2025-04-23 RX ADMIN — PROPOFOL 100 MCG/KG/MIN: 10 INJECTION, EMULSION INTRAVENOUS at 08:04

## 2025-04-23 RX ADMIN — PROPOFOL 30 MG: 10 INJECTION, EMULSION INTRAVENOUS at 08:10

## 2025-04-23 RX ADMIN — LIDOCAINE HYDROCHLORIDE 100 MG: 20 INJECTION, SOLUTION EPIDURAL; INFILTRATION; INTRACAUDAL; PERINEURAL at 08:03

## 2025-04-23 RX ADMIN — MIDAZOLAM HYDROCHLORIDE 2 MG: 1 INJECTION, SOLUTION INTRAMUSCULAR; INTRAVENOUS at 07:59

## 2025-04-23 RX ADMIN — ONDANSETRON 4 MG: 2 INJECTION INTRAMUSCULAR; INTRAVENOUS at 08:16

## 2025-04-23 ASSESSMENT — PAIN - FUNCTIONAL ASSESSMENT
PAIN_FUNCTIONAL_ASSESSMENT: 0-10

## 2025-04-23 ASSESSMENT — COLUMBIA-SUICIDE SEVERITY RATING SCALE - C-SSRS
2. HAVE YOU ACTUALLY HAD ANY THOUGHTS OF KILLING YOURSELF?: NO
6. HAVE YOU EVER DONE ANYTHING, STARTED TO DO ANYTHING, OR PREPARED TO DO ANYTHING TO END YOUR LIFE?: NO
1. IN THE PAST MONTH, HAVE YOU WISHED YOU WERE DEAD OR WISHED YOU COULD GO TO SLEEP AND NOT WAKE UP?: NO

## 2025-04-23 ASSESSMENT — PAIN SCALES - GENERAL
PAINLEVEL_OUTOF10: 0 - NO PAIN

## 2025-04-23 NOTE — ANESTHESIA POSTPROCEDURE EVALUATION
Patient: Tomeka Perea    Procedure Summary       Date: 04/23/25 Room / Location: [facility]    Anesthesia Start: 0800 Anesthesia Stop: 0826    Procedure: COLONOSCOPY Diagnosis: History of adenomatous and serrated colon polyps    Scheduled Providers: Gina Lombardi MD, MS; Tommie Donaldson MD; KELL Diaz; Delia Mclaughlin RN Responsible Provider: Tommie Donaldson MD    Anesthesia Type: MAC ASA Status: 3            Anesthesia Type: MAC    Vitals Value Taken Time   /75 04/23/25 08:55   Temp 36.5 °C (97.7 °F) 04/23/25 08:22   Pulse 56 04/23/25 08:55   Resp 13 04/23/25 08:55   SpO2 100 04/23/25 11:31       Anesthesia Post Evaluation    Patient location during evaluation: bedside  Patient participation: complete - patient participated  Level of consciousness: awake  Pain management: adequate  Multimodal analgesia pain management approach  Airway patency: patent  Cardiovascular status: stable  Respiratory status: spontaneous ventilation and unassisted  Hydration status: acceptable  Postoperative Nausea and Vomiting: none  Comments: No significant PONV.        There were no known notable events for this encounter.

## 2025-04-23 NOTE — H&P
"History Of Present Illness  Tomeka Perea is a 59 y.o. female presenting with polyp surveillance.     Past Medical History  Medical History[1]  Surgical History  Surgical History[2]  Social History  She reports that she has never smoked. She has never been exposed to tobacco smoke. She has never used smokeless tobacco. She reports that she does not drink alcohol and does not use drugs.    Family History  Family History[3]     Allergies  Allergies[4]  Review of Systems     Physical Exam     Last Recorded Vitals  Blood pressure 129/74, pulse 51, temperature 36.7 °C (98.1 °F), temperature source Temporal, resp. rate 14, height 1.803 m (5' 11\"), weight 80 kg (176 lb 5.9 oz), SpO2 99%.    Assessment/Plan   Personal history of colon polyp  Factor X deficiency     Gina Lombardi MD, MS       [1]   Past Medical History:  Diagnosis Date    Abdominal distension (gaseous) 02/03/2021    Abdominal bloating    Acute sinusitis, unspecified 09/21/2013    Acute sinusitis    Body mass index (BMI) 34.0-34.9, adult 10/05/2021    BMI 34.0-34.9,adult    Body mass index (BMI) 34.0-34.9, adult     BMI 34.0-34.9,adult    Body mass index (BMI) 35.0-35.9, adult 02/15/2021    BMI 35.0-35.9,adult    Cough, unspecified 09/21/2013    Cough    Encounter for follow-up examination after completed treatment for conditions other than malignant neoplasm 10/05/2021    Hospital discharge follow-up    Encounter for screening for malignant neoplasm of colon 03/03/2021    Colon cancer screening    Factor X deficiency (Multi)     Hemorrhoids     Nontoxic goiter, 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    Obesity, unspecified 10/05/2021    Obesity (BMI 30-39.9)    Other intervertebral disc degeneration, lumbar region 07/03/2014    Disc degeneration, lumbar    Other specified abnormal immunological findings in serum 02/15/2021    Helicobacter pylori ab+    Palpitations 07/03/2014    Palpitations    Personal history of other diseases of the digestive " system 10/05/2021    History of rectal bleeding    Personal history of other diseases of the musculoskeletal system and connective tissue 10/22/2013    History of radiculopathy    Personal history of other diseases of the respiratory system 04/12/2017    History of chronic sinusitis    Personal history of other specified conditions 01/29/2020    History of palpitations    Personal history of other specified conditions 09/21/2013    History of postnasal drip    Personal history of other specified conditions 08/16/2022    History of epigastric pain    Rash and other nonspecific skin eruption 12/27/2017    Rash    Unspecified abdominal pain 02/15/2021    Intestinal cramps   [2]   Past Surgical History:  Procedure Laterality Date    COLONOSCOPY      CT ABDOMEN PELVIS ANGIOGRAM W AND/OR WO IV CONTRAST  09/21/2021    CT ABDOMEN PELVIS ANGIOGRAM W AND/OR WO IV CONTRAST 9/21/2021 [facility] CL[facility]ICAL LEGACY    IR ANGIOGRAM AORTA ABDOMEN  09/21/2021    IR ANGIOGRAM AORTA ABDOMEN 9/21/2021 [facility] CL[facility]ICAL LEGAC[facility]    IR ANGIOGRAM AORTA ABDOMEN  09/21/2021    IR ANGIOGRAM AORTA ABDOMEN 9/21/2021 [facility] CL[facility]ICAL LEGAC[facility]    IR ANGIOGRAM INFERIOR EPIGASTRIC  09/21/2021    IR ANGIOGRAM INFERIOR EPIGASTRIC 9/21/2021 [facility] CL[facility]ICAL LEGACY    IR ANGIOGRAM INFERIOR EPIGASTRIC PELVIC  09/21/2021    IR ANGIOGRAM INFERIOR EPIGASTRIC PELVIC 9/21/2021 [facility] CL[facility]ICAL LEGAC[facility]    IR ANGIOGRAM INFERIOR EPIGASTRIC PELVIC  09/21/2021    IR ANGIOGRAM INFERIOR EPIGASTRIC PELVIC 9/21/2021 [facility] CL[facility]ICAL LEGAC[facility]    IR ANGIOGRAM INFERIOR EPIGASTRIC PELVIC  09/21/2021    IR ANGIOGRAM INFERIOR EPIGASTRIC PELVIC 9/21/2021 [facility] CL[facility]ICAL [facility]GAC[facility]    IR INTERVENTION VENOUS ARTERIAL PTA  09/21/2021    IR INTERVENTION VENOUS ARTERIAL PTA 9/21/2021 [facility] CL[facility]ICAL [facility]GAC[facility]    OTHER SURGICAL HISTORY  07/22/2014    Repair Of Cleft Lip    OTHER SURGICAL HISTORY  07/22/2014    Subcutaneous Injection Of Filling Material   [3]   Family History  Problem Relation Name Age of Onset    Breast  cancer Mother      Hypertension Father      Kidney disease Father     [4]   Allergies  Allergen Reactions    Erythromycin Hives    Keflex [Cephalexin] Hives    Penicillin Hives

## 2025-04-23 NOTE — ANESTHESIA PREPROCEDURE EVALUATION
Patient: Tomeka Perea    Procedure Information       Date/Time: 04/23/25 0730    Scheduled providers: Gina Lombardi MD, MS; Tommie Donaldson MD; KELL Diaz; Dleia Mclaughlin RN    Procedure: COLONOSCOPY    Location: [facility]            Factor X deficiency (Multi)     Relevant Problems   Cardiac   (+) Hypercholesterolemia      Neuro   (+) Lumbosacral radiculopathy at L5      GI   (+) Esophageal reflux   (+) Rectal bleeding   (+) Rectum bleeding      Endocrine   (+) Goiter      Hematology   (+) Bleeding disorder   (+) Factor X deficiency (Multi)      Skin   (+) Rash and other nonspecific skin eruption       Clinical information reviewed:   Tobacco  Allergies  Meds   Med Hx  Surg Hx  OB Status  Fam Hx  Soc   Hx        NPO Detail:  NPO/Void Status  Carbohydrate Drink Given Prior to Surgery? : Y  Date of Last Liquid: 04/23/25  Time of Last Liquid: 0300  Date of Last Solid: 04/23/25  Time of Last Solid: 0800  Last Intake Type: Clear fluids  Time of Last Void: 0600         Physical Exam    Airway  Mallampati: II  TM distance: >3 FB  Neck ROM: full     Cardiovascular   Rhythm: regular  Rate: normal     Dental    Pulmonary Breath sounds clear to auscultation     Abdominal            Anesthesia Plan    History of general anesthesia?: yes  History of complications of general anesthesia?: no    ASA 3     MAC     intravenous induction   Anesthetic plan and risks discussed with patient.    Plan discussed with CRNA.

## 2025-04-24 ASSESSMENT — PAIN SCALES - GENERAL: PAINLEVEL_OUTOF10: 0 - NO PAIN

## 2025-07-08 ENCOUNTER — OFFICE VISIT (OUTPATIENT)
Dept: URGENT CARE | Age: 60
End: 2025-07-08
Payer: OTHER GOVERNMENT

## 2025-07-08 ENCOUNTER — HOSPITAL ENCOUNTER (OUTPATIENT)
Dept: RADIOLOGY | Facility: HOSPITAL | Age: 60
Discharge: HOME | End: 2025-07-08
Payer: OTHER GOVERNMENT

## 2025-07-08 VITALS
RESPIRATION RATE: 18 BRPM | BODY MASS INDEX: 25.2 KG/M2 | WEIGHT: 180 LBS | TEMPERATURE: 97.4 F | SYSTOLIC BLOOD PRESSURE: 128 MMHG | OXYGEN SATURATION: 99 % | HEART RATE: 98 BPM | DIASTOLIC BLOOD PRESSURE: 85 MMHG | HEIGHT: 71 IN

## 2025-07-08 DIAGNOSIS — M25.561 ACUTE PAIN OF RIGHT KNEE: Primary | ICD-10-CM

## 2025-07-08 DIAGNOSIS — M25.561 ACUTE PAIN OF RIGHT KNEE: ICD-10-CM

## 2025-07-08 PROCEDURE — 73564 X-RAY EXAM KNEE 4 OR MORE: CPT | Mod: RT

## 2025-07-08 PROCEDURE — 3008F BODY MASS INDEX DOCD: CPT | Performed by: NURSE PRACTITIONER

## 2025-07-08 PROCEDURE — 1036F TOBACCO NON-USER: CPT | Performed by: NURSE PRACTITIONER

## 2025-07-08 PROCEDURE — 99213 OFFICE O/P EST LOW 20 MIN: CPT | Performed by: NURSE PRACTITIONER

## 2025-07-08 PROCEDURE — 73564 X-RAY EXAM KNEE 4 OR MORE: CPT | Performed by: NURSE PRACTITIONER

## 2025-07-08 RX ORDER — NAPROXEN SODIUM 550 MG/1
550 TABLET ORAL
Qty: 30 TABLET | Refills: 0 | Status: SHIPPED | OUTPATIENT
Start: 2025-07-08 | End: 2025-07-23

## 2025-07-08 ASSESSMENT — ENCOUNTER SYMPTOMS
FEVER: 0
DIFFICULTY URINATING: 0
CONSTIPATION: 0
CHILLS: 0
WOUND: 0
EYE PAIN: 0
MYALGIAS: 0
APPETITE CHANGE: 0
NAUSEA: 0
ABDOMINAL PAIN: 0
ARTHRALGIAS: 0
DIZZINESS: 0
CHEST TIGHTNESS: 0
VOMITING: 0
SINUS PAIN: 0
TROUBLE SWALLOWING: 0
DIARRHEA: 0
NECK PAIN: 0
FATIGUE: 0
SINUS PRESSURE: 0
SORE THROAT: 0
PALPITATIONS: 0
BACK PAIN: 0
COUGH: 0
RHINORRHEA: 0
HEADACHES: 0
SHORTNESS OF BREATH: 0

## 2025-07-08 ASSESSMENT — VISUAL ACUITY: OU: 1

## 2025-07-08 NOTE — PROGRESS NOTES
Subjective   Patient ID: Tomeka Perea is a 59 y.o. female. They present today with a chief complaint of Knee Injury (Yesterdays 3pm/) and Work Related Injury.    History of Present Illness  The patient is a 60yo female who presents with a knee injury that happened at work yesterday at 3pm. Workers comp. Patient is a  and fell in a hole when walking across a yard with a heavy package.           Past Medical History  Allergies as of 07/08/2025 - Reviewed 07/08/2025   Allergen Reaction Noted    Erythromycin Hives 04/07/2023    Keflex [cephalexin] Hives 04/07/2023    Penicillin Hives 04/07/2023       Prescriptions Prior to Admission[1]     Medical History[2]    Surgical History[3]     reports that she has never smoked. She has never been exposed to tobacco smoke. She has never used smokeless tobacco. She reports that she does not drink alcohol and does not use drugs.    Review of Systems  Review of Systems   Constitutional:  Negative for appetite change, chills, fatigue and fever.   HENT:  Negative for congestion, dental problem, ear pain, hearing loss, postnasal drip, rhinorrhea, sinus pressure, sinus pain, sneezing, sore throat and trouble swallowing.    Eyes:  Negative for pain.   Respiratory:  Negative for cough, chest tightness and shortness of breath.    Cardiovascular:  Negative for chest pain and palpitations.   Gastrointestinal:  Negative for abdominal pain, constipation, diarrhea, nausea and vomiting.   Genitourinary:  Negative for difficulty urinating.   Musculoskeletal:  Positive for gait problem. Negative for arthralgias, back pain, myalgias and neck pain.   Skin:  Negative for rash and wound.   Neurological:  Negative for dizziness and headaches.   Psychiatric/Behavioral:  Negative for self-injury and suicidal ideas.                                   Objective    Vitals:    07/08/25 1200   BP: 128/85   Pulse: 98   Resp: 18   Temp: 36.3 °C (97.4 °F)   TempSrc: Oral   SpO2: 99%   Weight:  "81.6 kg (180 lb)   Height: 1.803 m (5' 11\")     No LMP recorded. Patient is postmenopausal.    Physical Exam  Vitals reviewed.   Constitutional:       General: She is awake. She is not in acute distress.     Appearance: Normal appearance. She is well-developed, well-groomed and normal weight. She is not ill-appearing.   HENT:      Head: Normocephalic and atraumatic.      Right Ear: Hearing and external ear normal.      Left Ear: Hearing and external ear normal.      Nose: Nose normal. No nasal deformity or signs of injury.      Mouth/Throat:      Lips: Pink.   Eyes:      General: Lids are normal. Vision grossly intact.   Cardiovascular:      Rate and Rhythm: Normal rate and regular rhythm.      Heart sounds: Normal heart sounds, S1 normal and S2 normal. Heart sounds not distant. No murmur heard.  Pulmonary:      Effort: Pulmonary effort is normal. No tachypnea, bradypnea, accessory muscle usage, prolonged expiration, respiratory distress or retractions.      Breath sounds: Normal breath sounds and air entry. No stridor, decreased air movement or transmitted upper airway sounds. No decreased breath sounds.   Chest:      Chest wall: No deformity.   Musculoskeletal:      Right knee: Swelling present. No deformity, effusion, erythema, ecchymosis, lacerations, bony tenderness or crepitus. Decreased range of motion. Tenderness present over the medial joint line, lateral joint line, MCL, LCL and patellar tendon. Normal pulse.      Left knee: Normal.   Skin:     General: Skin is warm and dry.      Capillary Refill: Capillary refill takes less than 2 seconds.   Neurological:      General: No focal deficit present.      Mental Status: She is alert.      Gait: Gait is intact.   Psychiatric:         Attention and Perception: Attention and perception normal.         Mood and Affect: Mood and affect normal.         Speech: Speech normal.         Behavior: Behavior normal. Behavior is cooperative.         Procedures    Point of " Care Test & Imaging Results from this visit  No results found for this visit on 07/08/25.   Imaging  No results found.    Cardiology, Vascular, and Other Imaging  No other imaging results found for the past 2 days      Diagnostic study results (if any) were reviewed by DAVINA Bernard.    Assessment/Plan   Allergies, medications, history, and pertinent labs/EKGs/Imaging reviewed by DAVINA Bernard.     Medical Decision Making  Imaging shows no evidence of fracture. It does show moderate OA and joint effusion. Advised RICE. Knee placed in compression sleeve. Patient to follow up with [facility].     Risks, benefits, and alternatives of the medications and treatment plan prescribed today were discussed, and patient expressed understanding. Plan follow up as discussed or as needed if any worsening symptoms or change in condition. Reinforced red flags including (but not limited to): severe or worsening abdominal pain; difficulty swallowing; stiff neck; shortness of breath; coughing or vomiting blood; chest pain; and new or increased fever are indications to go to the Emergency Department.    The patient voices understanding of all medications. No barriers to adherence. Patient is taking all medications as prescribed and tolerating well. For any new medications, the patient was instructed of directions for and consequences of not taking medication and they were informed about the potential side effects and drug interactions. The after-visit summary was given to the patient and care instructions were reviewed with the patient. All questions were answered and the patient verbalized understanding of the plan of care for today.    Orders and Diagnoses  There are no diagnoses linked to this encounter.    Medical Admin Record      Patient disposition: Home    Electronically signed by DAVINA Bernard  12:03 PM           [1] (Not in a hospital admission)  [2]   Past Medical  History:  Diagnosis Date    Abdominal distension (gaseous) 02/03/2021    Abdominal bloating    Acute sinusitis, unspecified 09/21/2013    Acute sinusitis    Body mass index (BMI) 34.0-34.9, adult 10/05/2021    BMI 34.0-34.9,adult    Body mass index (BMI) 34.0-34.9, adult     BMI 34.0-34.9,adult    Body mass index (BMI) 35.0-35.9, adult 02/15/2021    BMI 35.0-35.9,adult    Cough, unspecified 09/21/2013    Cough    Encounter for follow-up examination after completed treatment for conditions other than malignant neoplasm 10/05/2021    Hospital discharge follow-up    Encounter for screening for malignant neoplasm of colon 03/03/2021    Colon cancer screening    Factor X deficiency (Multi)     Hemorrhoids     Nontoxic goiter, 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    Obesity, unspecified 10/05/2021    Obesity (BMI 30-39.9)    Other intervertebral disc degeneration, lumbar region 07/03/2014    Disc degeneration, lumbar    Other specified abnormal immunological findings in serum 02/15/2021    Helicobacter pylori ab+    Palpitations 07/03/2014    Palpitations    Personal history of other diseases of the digestive system 10/05/2021    History of rectal bleeding    Personal history of other diseases of the musculoskeletal system and connective tissue 10/22/2013    History of radiculopathy    Personal history of other diseases of the respiratory system 04/12/2017    History of chronic sinusitis    Personal history of other specified conditions 01/29/2020    History of palpitations    Personal history of other specified conditions 09/21/2013    History of postnasal drip    Personal history of other specified conditions 08/16/2022    History of epigastric pain    Rash and other nonspecific skin eruption 12/27/2017    Rash    Unspecified abdominal pain 02/15/2021    Intestinal cramps   [3]   Past Surgical History:  Procedure Laterality Date    COLONOSCOPY      CT ABDOMEN PELVIS ANGIOGRAM W AND/OR WO IV CONTRAST  09/21/2021     CT ABDOMEN PELVIS ANGIOGRAM W AND/OR WO IV CONTRAST 9/21/2021 [facility]    IR ANGIOGRAM AORTA ABDOMEN  09/21/2021    IR ANGIOGRAM AORTA ABDOMEN 9/21/2021 [facility]    IR ANGIOGRAM AORTA ABDOMEN  09/21/2021    IR ANGIOGRAM AORTA ABDOMEN 9/21/2021 [facility]    IR ANGIOGRAM INFERIOR EPIGASTRIC  09/21/2021    IR ANGIOGRAM INFERIOR EPIGASTRIC 9/21/2021 [facility]    IR ANGIOGRAM INFERIOR EPIGASTRIC PELVIC  09/21/2021    IR ANGIOGRAM INFERIOR EPIGASTRIC PELVIC 9/21/2021 [facility]    IR ANGIOGRAM INFERIOR EPIGASTRIC PELVIC  09/21/2021    IR ANGIOGRAM INFERIOR EPIGASTRIC PELVIC 9/21/2021 [facility]    IR ANGIOGRAM INFERIOR EPIGASTRIC PELVIC  09/21/2021    IR ANGIOGRAM INFERIOR EPIGASTRIC PELVIC 9/21/2021 [facility]    IR INTERVENTION VENOUS ARTERIAL PTA  09/21/2021    IR INTERVENTION VENOUS ARTERIAL PTA 9/21/2021 [facility]    OTHER SURGICAL HISTORY  07/22/2014    Repair Of Cleft Lip    OTHER SURGICAL HISTORY  07/22/2014    Subcutaneous Injection Of Filling Material

## 2025-07-30 ASSESSMENT — DERMATOLOGY QUALITY OF LIFE (QOL) ASSESSMENT
RATE HOW EMOTIONALLY BOTHERED YOU ARE BY YOUR SKIN PROBLEM (FOR EXAMPLE, WORRY, EMBARRASSMENT, FRUSTRATION): 0 - NEVER BOTHERED
RATE HOW BOTHERED YOU ARE BY SYMPTOMS OF YOUR SKIN PROBLEM (EG, ITCHING, STINGING BURNING, HURTING OR SKIN IRRITATION): 2
RATE HOW BOTHERED YOU ARE BY SYMPTOMS OF YOUR SKIN PROBLEM (EG, ITCHING, STINGING BURNING, HURTING OR SKIN IRRITATION): 2
RATE HOW BOTHERED YOU ARE BY EFFECTS OF YOUR SKIN PROBLEMS ON YOUR ACTIVITIES (EG, GOING OUT, ACCOMPLISHING WHAT YOU WANT, WORK ACTIVITIES OR YOUR RELATIONSHIPS WITH OTHERS): 0 - NEVER BOTHERED
WHAT SINGLE SKIN CONDITION LISTED BELOW IS THE PATIENT ANSWERING THE QUALITY-OF-LIFE ASSESSMENT QUESTIONS ABOUT: NONE OF THE ABOVE
WHAT SINGLE SKIN CONDITION LISTED BELOW IS THE PATIENT ANSWERING THE QUALITY-OF-LIFE ASSESSMENT QUESTIONS ABOUT: NONE OF THE ABOVE
RATE HOW BOTHERED YOU ARE BY EFFECTS OF YOUR SKIN PROBLEMS ON YOUR ACTIVITIES (EG, GOING OUT, ACCOMPLISHING WHAT YOU WANT, WORK ACTIVITIES OR YOUR RELATIONSHIPS WITH OTHERS): 0 - NEVER BOTHERED
RATE HOW EMOTIONALLY BOTHERED YOU ARE BY YOUR SKIN PROBLEM (FOR EXAMPLE, WORRY, EMBARRASSMENT, FRUSTRATION): 0 - NEVER BOTHERED

## 2025-07-30 ASSESSMENT — PATIENT GLOBAL ASSESSMENT (PGA): WHAT IS THE PGA: PATIENT GLOBAL ASSESSMENT:  2 - MILD

## 2025-07-30 NOTE — PROGRESS NOTES
Subjective     Tomeka Perea is a 59 y.o. female who presents for the following: Skin Check and Suspicious Skin Lesion.  She notes a new scaly bump on her right forearm, which has been present for several months, has increased in size, and does not heal.  She also notes intermittent pimple breakouts on her thighs and many tiny, rough bumps on her arms and would also like to talk about possible treatments for wrinkles.  She denies any other new, changing, or concerning skin lesions; no bleeding, itching, or burning lesions.      Review of Systems:  No other skin or systemic complaints other than what is documented elsewhere in the note.    The following portions of the chart were reviewed this encounter and updated as appropriate:       Skin Cancer History  Biopsy Log Book  No skin cancers from Specimen Tracking.    Additional History      Specialty Problems          Dermatology Problems    Hemangioma of skin and subcutaneous tissue    Inflamed seborrheic keratosis    Melanocytic nevi of other parts of face    Melanocytic nevi of scalp and neck    Melanocytic nevi of trunk    Melanocytic nevi of unspecified lower limb, including hip    Melanocytic nevi of unspecified upper limb, including shoulder    Neoplasm of uncertain behavior of skin    Other melanin hyperpigmentation    Other seborrheic keratosis    Other specified follicular disorders    Poikiloderma of [place]    Rash and other nonspecific skin eruption    Skin changes due to chronic exposure to nonionizing radiation, unspecified       Past Dermatologic / Past Relevant Medical History:    No history of atypical nevi or skin cancer    Family History:    No family history of melanoma or skin cancer    Social History:    The patient states she works as a     Allergies:  Erythromycin, Keflex [cephalexin], and Penicillin    Current Medications / CAM's:  Current Medications[1]     Objective   Well appearing patient in no apparent distress; mood and  affect are within normal limits.    A full examination was performed including scalp, face, eyes, ears, nose, lips, neck, chest, axillae, abdomen, back, bilateral upper extremities, and bilateral lower extremities. All findings within normal limits unless otherwise noted below.    Assessment/Plan   Skin Exam  1. NEOPLASM OF UNCERTAIN BEHAVIOR OF SKIN  Right Distal Dorsal Forearm  8 mm pink and brown, scaly papule     - Shave removal    Lesion length (cm):  0.8  Margin per side (cm):  0  Lesion diameter (cm):  0.8  Informed consent: discussed and consent obtained    Timeout: patient name, date of birth, surgical site, and procedure verified    Procedure prep:  Patient was prepped and draped  Anesthesia: the lesion was anesthetized in a standard fashion    Anesthetic:  1% lidocaine w/ epinephrine 1-100,000 local infiltration  Instrument used: flexible razor blade    Hemostasis achieved with: aluminum chloride    Outcome: patient tolerated procedure well    Post-procedure details: sterile dressing applied and wound care instructions given    Dressing type: bandage and petrolatum      - Staff Communication: Dermatology Local Anesthesia: 1 % Lidocaine / Epinephrine - Amount:0.5ml  Specimen 1 - Dermatopathology- DERM LAB  Differential Diagnosis: SK v SCCIS  Check Margins Yes/No?:    Comments:    Dermpath Lab: Routine Histopathology (formalin-fixed tissue)  2. MELANOCYTIC NEVUS OF TRUNK  Generalized  Scattered on the patient's face, neck, trunk, and extremities, there are several small, round- to oval-shaped, brown-pigmented and pink-colored, symmetric, uniform-appearing macules and dome-shaped papules  Clinically benign- to slightly atypical-appearing nevi - the clinically benign- to slightly atypical-appearing nature of the patient's nevi was discussed with the patient today.  None of the patient's nevi meet threshold for biopsy today.  I emphasized the importance of performing monthly self-skin exams using the ABCDs of  monitoring moles, which were reviewed with the patient today and an informational hand-out provided.  I also emphasized the importance of sun avoidance and sun protection with daily sunscreen use.  The patient expressed understanding and is in agreement with this plan.  3. SEBORRHEIC KERATOSIS  Generalized  Scattered on the patient's face, neck, trunk, and extremities, there are several tan- to light brown-colored, hyperkeratotic, stuck-on appearing papules of varying size and shape  Seborrheic Keratoses - the benign nature of these lesions was discussed with the patient today and reassurance provided.  No treatment is medically indicated for these lesions at this time.  4. HEMANGIOMA OF SKIN  Generalized  Scattered on the patient's face, neck, trunk, and extremities, there are multiple small, round, cherry red- to purplish-colored, symmetric, uniform, vascular-appearing macules and papules  Cherry Angiomas - the benign nature of these vascular lesions was discussed with the patient today and reassurance provided.  No treatment is medically indicated for these lesions at this time.  5. FOLLICULITIS  Left Medial Thigh  Scattered on the patient's bilateral thighs, there are several follicular-based erythematous, inflammatory papules and pustules  Folliculitis -flare on bilateral thighs.  The bacterial nature of this condition and treatment options were discussed with the patient today.  At this time, I recommend topical antibiotic therapy with Clindamycin 1% lotion, which the patient was instructed to apply twice daily to the affected areas or up to 3-4 times per day as needed for active lesions.  The risks, benefits, and side effects of this medication were discussed.  The patient expressed understanding and is in agreement with this plan.  - clindamycin (Cleocin T) 1 % lotion - Left Medial Thigh - Apply topically 2 times a day.  6. KERATOSIS PILARIS  Right Upper Arm - Anterior  On the patient's bilateral arms, there  are many tiny, 1-2 mm, follicular-based, slightly hyperkeratotic, spiny papules.  Keratosis Pilaris -bilateral arms.  The genetic, chronic, and intermittently flaring nature of this condition, the difficulty in treating it, and treatment options were discussed extensively with the patient today.  At this time, I recommend topical keratolytic therapy and moisturization with Ammonium Lactate 12% lotion, which the patient was instructed to apply twice daily to the affected areas for moisturization.  The risks, benefits, and side effects of this medication were discussed.  The patient expressed understanding and is in agreement with this plan.  - ammonium lactate (Lac-Hydrin) 12 % lotion - Right Upper Arm - Anterior - Apply 1 Application topically 2 times a day. As needed for moisturization  7. DIFFUSE PHOTODAMAGE OF SKIN  Photodistributed  Diffuse photodamage with actinic changes with telangiectasia and mottled pigmentation in sun-exposed areas.  Photodamage.  The signs and symptoms of skin cancer were reviewed and the patient was advised to practice sun protection and sun avoidance, use daily sunscreen, and perform regular self skin exams.  In addition, the patient wished to discuss possible medical treatment for photoaging.  Thus, at this time, I recommend the patient begin topical retinoid therapy with Tretinoin 0.025% cream at night.  The risks, benefits, and side effects of this medication, including the redness, dryness, and irritation expected with its use, were discussed; the patient was instructed to begin use of Tretinoin 1-2 nights per week and increase to every other night, then every night as tolerated without excessive dryness and irritation.  The patient was also informed this medication will likely be considered cosmetic and thus may not be covered by their insurance company and require out-of-pocket payment.  The patient expressed understanding and is in agreement with this plan.  - tretinoin (Retin-A)  0.025 % cream - Photodistributed - Apply 1 Application topically once daily at bedtime. Start 1-2 nights per week 1-2 weeks, inc to every other night, then every night as tolerated          [1]   Current Outpatient Medications:     clobetasol (Temovate) 0.05 % cream, 1 Application., Disp: , Rfl:     cyclobenzaprine (Flexeril) 10 mg tablet, Take 1 tablet (10 mg) by mouth as needed at bedtime for muscle spasms., Disp: 30 tablet, Rfl: 2    hydrocortisone (Anusol-HC) 2.5 % rectal cream, Insert into the rectum 2 times a day., Disp: 28 g, Rfl: 0    sod sulf-pot chloride-mag sulf (Sutab) 1.479-0.188- 0.225 gram tablet tablet, Starting at 6pm open one bottle of pills and fill glass provided with water and drink according to prep sheet. Start the 2nd bottle with directions on prep sheet 5 hours before procedure time, Disp: 24 tablet, Rfl: 0    ammonium lactate (Lac-Hydrin) 12 % lotion, Apply 1 Application topically 2 times a day. As needed for moisturization, Disp: 225 g, Rfl: 11    clindamycin (Cleocin T) 1 % lotion, Apply topically 2 times a day., Disp: 60 mL, Rfl: 11    fluticasone (Flonase Sensimist) 27.5 mcg/actuation nasal spray, Administer 1 spray into each nostril once daily. (Patient not taking: Reported on 4/23/2025), Disp: 10 g, Rfl: 5    levocetirizine (Xyzal) 5 mg tablet, Take 1 tablet (5 mg) by mouth once daily in the evening for 14 days., Disp: 14 tablet, Rfl: 1    loratadine (Claritin) 10 mg tablet, Take 1 tablet (10 mg) by mouth once daily., Disp: 30 tablet, Rfl: 2    olopatadine (Patanol) 0.1 % ophthalmic solution, Administer 1 drop into both eyes 2 times a day as needed for allergies for up to 15 days., Disp: 5 mL, Rfl: 2    tretinoin (Retin-A) 0.025 % cream, Apply 1 Application topically once daily at bedtime. Start 1-2 nights per week 1-2 weeks, inc to every other night, then every night as tolerated, Disp: 45 g, Rfl: 11

## 2025-07-31 ENCOUNTER — APPOINTMENT (OUTPATIENT)
Dept: DERMATOLOGY | Facility: CLINIC | Age: 60
End: 2025-07-31
Payer: COMMERCIAL

## 2025-07-31 DIAGNOSIS — L82.1 SEBORRHEIC KERATOSIS: ICD-10-CM

## 2025-07-31 DIAGNOSIS — L73.9 FOLLICULITIS: ICD-10-CM

## 2025-07-31 DIAGNOSIS — L85.8 KERATOSIS PILARIS: ICD-10-CM

## 2025-07-31 DIAGNOSIS — D18.01 HEMANGIOMA OF SKIN: ICD-10-CM

## 2025-07-31 DIAGNOSIS — D22.5 MELANOCYTIC NEVUS OF TRUNK: ICD-10-CM

## 2025-07-31 DIAGNOSIS — L57.8 DIFFUSE PHOTODAMAGE OF SKIN: ICD-10-CM

## 2025-07-31 DIAGNOSIS — D48.5 NEOPLASM OF UNCERTAIN BEHAVIOR OF SKIN: Primary | ICD-10-CM

## 2025-07-31 PROCEDURE — 11301 SHAVE SKIN LESION 0.6-1.0 CM: CPT | Performed by: DERMATOLOGY

## 2025-07-31 PROCEDURE — 99204 OFFICE O/P NEW MOD 45 MIN: CPT | Performed by: DERMATOLOGY

## 2025-07-31 RX ORDER — CLINDAMYCIN PHOSPHATE 10 UG/ML
LOTION TOPICAL 2 TIMES DAILY
Qty: 60 ML | Refills: 11 | Status: SHIPPED | OUTPATIENT
Start: 2025-07-31 | End: 2026-07-31

## 2025-07-31 RX ORDER — TRETINOIN 0.25 MG/G
1 CREAM TOPICAL NIGHTLY
Qty: 45 G | Refills: 11 | Status: SHIPPED | OUTPATIENT
Start: 2025-07-31

## 2025-07-31 RX ORDER — AMMONIUM LACTATE 12 G/100G
1 LOTION TOPICAL 2 TIMES DAILY
Qty: 225 G | Refills: 11 | Status: SHIPPED | OUTPATIENT
Start: 2025-07-31 | End: 2026-07-31

## 2025-07-31 ASSESSMENT — DERMATOLOGY QUALITY OF LIFE (QOL) ASSESSMENT
ARE THERE EXCLUSIONS OR EXCEPTIONS FOR THE QUALITY OF LIFE ASSESSMENT: NO
RATE HOW EMOTIONALLY BOTHERED YOU ARE BY YOUR SKIN PROBLEM (FOR EXAMPLE, WORRY, EMBARRASSMENT, FRUSTRATION): 0 - NEVER BOTHERED
RATE HOW BOTHERED YOU ARE BY EFFECTS OF YOUR SKIN PROBLEMS ON YOUR ACTIVITIES (EG, GOING OUT, ACCOMPLISHING WHAT YOU WANT, WORK ACTIVITIES OR YOUR RELATIONSHIPS WITH OTHERS): 0 - NEVER BOTHERED
DATE THE QUALITY-OF-LIFE ASSESSMENT WAS COMPLETED: 67417
RATE HOW BOTHERED YOU ARE BY SYMPTOMS OF YOUR SKIN PROBLEM (EG, ITCHING, STINGING BURNING, HURTING OR SKIN IRRITATION): 0 - NEVER BOTHERED
WHAT SINGLE SKIN CONDITION LISTED BELOW IS THE PATIENT ANSWERING THE QUALITY-OF-LIFE ASSESSMENT QUESTIONS ABOUT: NONE OF THE ABOVE

## 2025-07-31 ASSESSMENT — DERMATOLOGY PATIENT ASSESSMENT
ARE YOU AN ORGAN TRANSPLANT RECIPIENT: NO
HAVE YOU HAD OR DO YOU HAVE A STAPH INFECTION: NO
DO YOU HAVE ANY NEW OR CHANGING LESIONS: YES
ARE YOU TRYING TO GET PREGNANT: NO
DO YOU USE A TANNING BED: YES, PREVIOUSLY
DO YOU USE SUNSCREEN: DAILY
HAVE YOU HAD OR DO YOU HAVE VASCULAR DISEASE: NO
DO YOU HAVE IRREGULAR MENSTRUAL CYCLES: NO
ARE YOU ON BIRTH CONTROL: NO

## 2025-07-31 ASSESSMENT — ITCH NUMERIC RATING SCALE: HOW SEVERE IS YOUR ITCHING?: 0

## 2025-07-31 ASSESSMENT — PATIENT GLOBAL ASSESSMENT (PGA): PATIENT GLOBAL ASSESSMENT: PATIENT GLOBAL ASSESSMENT:  1 - CLEAR

## 2025-07-31 NOTE — Clinical Note
Davila Angiomas - the benign nature of these vascular lesions was discussed with the patient today and reassurance provided.  No treatment is medically indicated for these lesions at this time.

## 2025-07-31 NOTE — Clinical Note
Keratosis Pilaris -bilateral arms.  The genetic, chronic, and intermittently flaring nature of this condition, the difficulty in treating it, and treatment options were discussed extensively with the patient today.  At this time, I recommend topical keratolytic therapy and moisturization with Ammonium Lactate 12% lotion, which the patient was instructed to apply twice daily to the affected areas for moisturization.  The risks, benefits, and side effects of this medication were discussed.  The patient expressed understanding and is in agreement with this plan.

## 2025-07-31 NOTE — Clinical Note
On the patient's bilateral arms, there are many tiny, 1-2 mm, follicular-based, slightly hyperkeratotic, spiny papules.

## 2025-07-31 NOTE — Clinical Note
Scattered on the patient's bilateral thighs, there are several follicular-based erythematous, inflammatory papules and pustules

## 2025-07-31 NOTE — Clinical Note
Folliculitis -flare on bilateral thighs.  The bacterial nature of this condition and treatment options were discussed with the patient today.  At this time, I recommend topical antibiotic therapy with Clindamycin 1% lotion, which the patient was instructed to apply twice daily to the affected areas or up to 3-4 times per day as needed for active lesions.  The risks, benefits, and side effects of this medication were discussed.  The patient expressed understanding and is in agreement with this plan.

## 2025-08-04 LAB
LABORATORY COMMENT REPORT: NORMAL
PATH REPORT.FINAL DX SPEC: NORMAL
PATH REPORT.GROSS SPEC: NORMAL
PATH REPORT.MICROSCOPIC SPEC OTHER STN: NORMAL
PATH REPORT.RELEVANT HX SPEC: NORMAL
PATH REPORT.TOTAL CANCER: NORMAL